# Patient Record
Sex: MALE | Race: WHITE | Employment: OTHER | URBAN - METROPOLITAN AREA
[De-identification: names, ages, dates, MRNs, and addresses within clinical notes are randomized per-mention and may not be internally consistent; named-entity substitution may affect disease eponyms.]

---

## 2017-01-13 ENCOUNTER — MED REC SCAN ONLY (OUTPATIENT)
Dept: INTERNAL MEDICINE CLINIC | Facility: CLINIC | Age: 62
End: 2017-01-13

## 2017-01-17 ENCOUNTER — LAB ENCOUNTER (OUTPATIENT)
Dept: LAB | Facility: HOSPITAL | Age: 62
End: 2017-01-17
Attending: INTERNAL MEDICINE
Payer: COMMERCIAL

## 2017-01-17 DIAGNOSIS — D35.01 BENIGN NEOPLASM OF RIGHT ADRENAL GLAND: Primary | ICD-10-CM

## 2017-01-17 LAB — CORTISOL: 0.8 UG/DL

## 2017-01-17 PROCEDURE — 36415 COLL VENOUS BLD VENIPUNCTURE: CPT

## 2017-01-17 PROCEDURE — 83835 ASSAY OF METANEPHRINES: CPT

## 2017-01-17 PROCEDURE — 82533 TOTAL CORTISOL: CPT

## 2017-01-19 LAB
METANEPHRINE: 0.22 NMOL/L
NORMETANEPHRINE: 0.32 NMOL/L

## 2017-01-24 ENCOUNTER — PATIENT MESSAGE (OUTPATIENT)
Dept: INTERNAL MEDICINE CLINIC | Facility: CLINIC | Age: 62
End: 2017-01-24

## 2017-01-24 NOTE — TELEPHONE ENCOUNTER
From: Kevan Zamora  To:  Claudio Teixeira MD  Sent: 1/24/2017 3:08 PM CST  Subject: Visit Follow-up Question    I have visited Dr. Ruy Stahl and received notice that the blood tests pertaining to the spot found on my right adrenal gland have come back

## 2017-03-14 ENCOUNTER — TELEPHONE (OUTPATIENT)
Dept: INTERNAL MEDICINE CLINIC | Facility: CLINIC | Age: 62
End: 2017-03-14

## 2017-03-14 DIAGNOSIS — R10.30 LOWER ABDOMINAL PAIN: Primary | ICD-10-CM

## 2017-03-14 NOTE — TELEPHONE ENCOUNTER
Called pt to advise info per CB. Pt denied appt w [facility]. Pt states that he would like to see GI first. Pt will call to set up appt before he goes on vacation. Pt would like to see GI (Dr Rajeev Moss)  when he returns from vacation.   Then if need to be he will f/

## 2017-03-14 NOTE — TELEPHONE ENCOUNTER
LOV 11/16/16 w CB      Pt had CT ordered and co mpleted 11/16/16     Labs on 11/28/16  S/w Des Marquez- Pt would like to know when he should come in to f/u. Pt denies any new or worsening sxs. Pt will be on vacation 3/18/17 - 4/08/17.  Pt would like to know if

## 2017-03-14 NOTE — TELEPHONE ENCOUNTER
I would suggest pt see GI for lower abd pain if it is persistent.   Given CT abd/pelvis did not find a cause and per our records his last colonoscopy was in 2007 (at which time he had a polypectomy) he is due for repeat colonoscopy and this would be conside

## 2017-04-12 ENCOUNTER — MED REC SCAN ONLY (OUTPATIENT)
Dept: INTERNAL MEDICINE CLINIC | Facility: CLINIC | Age: 62
End: 2017-04-12

## 2017-04-26 ENCOUNTER — MED REC SCAN ONLY (OUTPATIENT)
Dept: INTERNAL MEDICINE CLINIC | Facility: CLINIC | Age: 62
End: 2017-04-26

## 2017-05-02 ENCOUNTER — LAB ENCOUNTER (OUTPATIENT)
Dept: LAB | Age: 62
End: 2017-05-02
Attending: INTERNAL MEDICINE
Payer: COMMERCIAL

## 2017-05-02 DIAGNOSIS — E55.9 VITAMIN D INSUFFICIENCY: ICD-10-CM

## 2017-05-02 DIAGNOSIS — R10.32 ABDOMINAL PAIN, LEFT LOWER QUADRANT: ICD-10-CM

## 2017-05-02 DIAGNOSIS — Z11.59 NEED FOR HEPATITIS C SCREENING TEST: Primary | ICD-10-CM

## 2017-05-02 PROCEDURE — 86803 HEPATITIS C AB TEST: CPT

## 2017-05-02 PROCEDURE — 36415 COLL VENOUS BLD VENIPUNCTURE: CPT

## 2017-05-02 PROCEDURE — 80053 COMPREHEN METABOLIC PANEL: CPT

## 2017-05-02 PROCEDURE — 85025 COMPLETE CBC W/AUTO DIFF WBC: CPT

## 2017-05-02 PROCEDURE — 82306 VITAMIN D 25 HYDROXY: CPT

## 2017-05-02 NOTE — PROGRESS NOTES
Quick Note:    Vit D now normal. Can decrease Vit D to 2000 int units daily.  Repeat Vit D level in 3-6 mos.  ______

## 2017-08-08 ENCOUNTER — MED REC SCAN ONLY (OUTPATIENT)
Dept: INTERNAL MEDICINE CLINIC | Facility: CLINIC | Age: 62
End: 2017-08-08

## 2018-01-25 ENCOUNTER — TELEPHONE (OUTPATIENT)
Dept: INTERNAL MEDICINE CLINIC | Facility: CLINIC | Age: 63
End: 2018-01-25

## 2018-01-25 DIAGNOSIS — Z01.84 IMMUNITY STATUS TESTING: Primary | ICD-10-CM

## 2018-01-25 NOTE — TELEPHONE ENCOUNTER
Patient states he nor his family know whether he had chicken pox as a child. Explained to pt that he cannot have the shingles vaccine unless we know whether he had the disease. Pended lab for immunity. Please advise.     LOV 11/16/16 with CB for Annual ex

## 2018-01-26 NOTE — TELEPHONE ENCOUNTER
Pt's wife Ariel Tena notified lab ordered to check immunity for chicken pox for [facility]. Pt's wife reminded pt has not been seen since 11/16/16 and needs to schedule a CPE. Wife verbalizes understanding.

## 2018-01-29 ENCOUNTER — LAB ENCOUNTER (OUTPATIENT)
Dept: LAB | Age: 63
End: 2018-01-29
Attending: PHYSICIAN ASSISTANT
Payer: COMMERCIAL

## 2018-01-29 DIAGNOSIS — Z01.84 IMMUNITY STATUS TESTING: ICD-10-CM

## 2018-01-29 PROCEDURE — 86787 VARICELLA-ZOSTER ANTIBODY: CPT

## 2018-01-30 LAB — VZV IGG SER IA-ACNC: POSITIVE

## 2018-01-31 NOTE — PROGRESS NOTES
+ varicella titer. OK to have shingles vaccine. Shingles vaccine can be discussed further (old vs new) at the CPE he is overdue for.

## 2018-06-30 ENCOUNTER — HOSPITAL ENCOUNTER (EMERGENCY)
Facility: HOSPITAL | Age: 63
Discharge: HOME | End: 2018-06-30
Attending: EMERGENCY MEDICINE
Payer: COMMERCIAL

## 2018-06-30 ENCOUNTER — APPOINTMENT (OUTPATIENT)
Dept: CT IMAGING | Facility: HOSPITAL | Age: 63
End: 2018-06-30
Attending: EMERGENCY MEDICINE
Payer: COMMERCIAL

## 2018-06-30 ENCOUNTER — APPOINTMENT (OUTPATIENT)
Dept: GENERAL RADIOLOGY | Facility: HOSPITAL | Age: 63
End: 2018-06-30
Attending: EMERGENCY MEDICINE
Payer: COMMERCIAL

## 2018-06-30 VITALS
WEIGHT: 199 LBS | BODY MASS INDEX: 28.49 KG/M2 | RESPIRATION RATE: 18 BRPM | SYSTOLIC BLOOD PRESSURE: 121 MMHG | HEIGHT: 70 IN | DIASTOLIC BLOOD PRESSURE: 78 MMHG | OXYGEN SATURATION: 96 % | TEMPERATURE: 101 F | HEART RATE: 96 BPM

## 2018-06-30 DIAGNOSIS — R50.9 FEVER OF UNKNOWN ORIGIN: Primary | ICD-10-CM

## 2018-06-30 LAB
ALBUMIN SERPL-MCNC: 3 G/DL (ref 3.5–4.8)
ALP LIVER SERPL-CCNC: 145 U/L (ref 45–117)
ALT SERPL-CCNC: 130 U/L (ref 17–63)
AST SERPL-CCNC: 91 U/L (ref 15–41)
BASOPHILS # BLD AUTO: 0.03 X10(3) UL (ref 0–0.1)
BASOPHILS NFR BLD AUTO: 0.4 %
BILIRUB SERPL-MCNC: 0.5 MG/DL (ref 0.1–2)
BILIRUB UR QL STRIP.AUTO: NEGATIVE
BUN BLD-MCNC: 12 MG/DL (ref 8–20)
CALCIUM BLD-MCNC: 9 MG/DL (ref 8.3–10.3)
CHLORIDE: 100 MMOL/L (ref 101–111)
CO2: 25 MMOL/L (ref 22–32)
CREAT BLD-MCNC: 1.26 MG/DL (ref 0.7–1.3)
EOSINOPHIL # BLD AUTO: 0.02 X10(3) UL (ref 0–0.3)
EOSINOPHIL NFR BLD AUTO: 0.2 %
ERYTHROCYTE [DISTWIDTH] IN BLOOD BY AUTOMATED COUNT: 13.8 % (ref 11.5–16)
GLUCOSE BLD-MCNC: 109 MG/DL (ref 70–99)
GLUCOSE UR STRIP.AUTO-MCNC: NEGATIVE MG/DL
HCT VFR BLD AUTO: 38 % (ref 37–53)
HGB BLD-MCNC: 12.5 G/DL (ref 13–17)
IMMATURE GRANULOCYTE COUNT: 0.09 X10(3) UL (ref 0–1)
IMMATURE GRANULOCYTE RATIO %: 1.1 %
KETONES UR STRIP.AUTO-MCNC: 80 MG/DL
LACTIC ACID: 0.9 MMOL/L (ref 0.5–2)
LEUKOCYTE ESTERASE UR QL STRIP.AUTO: NEGATIVE
LYMPHOCYTES # BLD AUTO: 0.8 X10(3) UL (ref 0.9–4)
LYMPHOCYTES NFR BLD AUTO: 9.4 %
M PROTEIN MFR SERPL ELPH: 7.7 G/DL (ref 6.1–8.3)
MCH RBC QN AUTO: 29.4 PG (ref 27–33.2)
MCHC RBC AUTO-ENTMCNC: 32.9 G/DL (ref 31–37)
MCV RBC AUTO: 89.4 FL (ref 80–99)
MONOCYTES # BLD AUTO: 0.62 X10(3) UL (ref 0.1–1)
MONOCYTES NFR BLD AUTO: 7.3 %
NEUTROPHIL ABS PRELIM: 6.94 X10 (3) UL (ref 1.3–6.7)
NEUTROPHILS # BLD AUTO: 6.94 X10(3) UL (ref 1.3–6.7)
NEUTROPHILS NFR BLD AUTO: 81.6 %
NITRITE UR QL STRIP.AUTO: NEGATIVE
PH UR STRIP.AUTO: 5 [PH] (ref 4.5–8)
PLATELET # BLD AUTO: 267 10(3)UL (ref 150–450)
POTASSIUM SERPL-SCNC: 4.1 MMOL/L (ref 3.6–5.1)
PROT UR STRIP.AUTO-MCNC: 100 MG/DL
RBC # BLD AUTO: 4.25 X10(6)UL (ref 4.3–5.7)
RED CELL DISTRIBUTION WIDTH-SD: 45.5 FL (ref 35.1–46.3)
SODIUM SERPL-SCNC: 134 MMOL/L (ref 136–144)
SP GR UR STRIP.AUTO: 1.03 (ref 1–1.03)
UROBILINOGEN UR STRIP.AUTO-MCNC: <2 MG/DL
WBC # BLD AUTO: 8.5 X10(3) UL (ref 4–13)

## 2018-06-30 PROCEDURE — 74177 CT ABD & PELVIS W/CONTRAST: CPT | Performed by: EMERGENCY MEDICINE

## 2018-06-30 PROCEDURE — 96360 HYDRATION IV INFUSION INIT: CPT

## 2018-06-30 PROCEDURE — 85025 COMPLETE CBC W/AUTO DIFF WBC: CPT | Performed by: EMERGENCY MEDICINE

## 2018-06-30 PROCEDURE — 36415 COLL VENOUS BLD VENIPUNCTURE: CPT

## 2018-06-30 PROCEDURE — 71046 X-RAY EXAM CHEST 2 VIEWS: CPT | Performed by: EMERGENCY MEDICINE

## 2018-06-30 PROCEDURE — 96361 HYDRATE IV INFUSION ADD-ON: CPT

## 2018-06-30 PROCEDURE — 87040 BLOOD CULTURE FOR BACTERIA: CPT | Performed by: EMERGENCY MEDICINE

## 2018-06-30 PROCEDURE — 80053 COMPREHEN METABOLIC PANEL: CPT | Performed by: EMERGENCY MEDICINE

## 2018-06-30 PROCEDURE — 99284 EMERGENCY DEPT VISIT MOD MDM: CPT

## 2018-06-30 PROCEDURE — 81001 URINALYSIS AUTO W/SCOPE: CPT | Performed by: EMERGENCY MEDICINE

## 2018-06-30 PROCEDURE — 83605 ASSAY OF LACTIC ACID: CPT | Performed by: EMERGENCY MEDICINE

## 2018-06-30 RX ORDER — IBUPROFEN 600 MG/1
600 TABLET ORAL ONCE
Status: COMPLETED | OUTPATIENT
Start: 2018-06-30 | End: 2018-06-30

## 2018-06-30 RX ORDER — ACETAMINOPHEN 500 MG
1000 TABLET ORAL ONCE
Status: COMPLETED | OUTPATIENT
Start: 2018-06-30 | End: 2018-06-30

## 2018-06-30 NOTE — ED INITIAL ASSESSMENT (HPI)
Patient states he had a fever and joint pain since Tuesday. Seen at immediate care Wednesday. Fever continues, now head, head, & feet are tingling; patient has decreased appetite and feels weak.

## 2018-06-30 NOTE — ED PROVIDER NOTES
Patient Seen in: [facility] Emergency Department    History   Patient presents with:  Fever (infectious)    Stated Complaint: Fever for 1 week, muscle aches, headache    HPI    53-year-old white male who presents emergency room today for complaint o (38.3 °C) (Oral)   Resp 18   Ht 177.8 cm (5' 10\")   Wt 90.3 kg   SpO2 96%   BMI 28.55 kg/m²         Physical Exam    Well-developed well-nourished male who is sitting on the gurney, he is awake and alert and appears in no acute discomfort or distress. Status                     ---------                               -----------         ------                     CBC W/ DIFFERENTIAL[[id number]]          Abnormal            Final result                 Please view results for these tests on the individual rest of his labs are generally unremarkable white count was 8.5 he did have somewhat of a left shift diminished lymphocyte count. Urinalysis is negative. Chest x-ray shows no pneumonia.   CT scan of the abdomen pelvis was negative for acute intra-abdomina

## 2018-07-02 ENCOUNTER — OFFICE VISIT (OUTPATIENT)
Dept: INTERNAL MEDICINE CLINIC | Facility: CLINIC | Age: 63
End: 2018-07-02

## 2018-07-02 ENCOUNTER — MED REC SCAN ONLY (OUTPATIENT)
Dept: INTERNAL MEDICINE CLINIC | Facility: CLINIC | Age: 63
End: 2018-07-02

## 2018-07-02 VITALS
SYSTOLIC BLOOD PRESSURE: 124 MMHG | TEMPERATURE: 98 F | BODY MASS INDEX: 29 KG/M2 | RESPIRATION RATE: 16 BRPM | DIASTOLIC BLOOD PRESSURE: 78 MMHG | HEART RATE: 72 BPM | WEIGHT: 198.81 LBS

## 2018-07-02 DIAGNOSIS — M25.50 ARTHRALGIA, UNSPECIFIED JOINT: ICD-10-CM

## 2018-07-02 DIAGNOSIS — R50.9 FEVER, UNSPECIFIED FEVER CAUSE: Primary | ICD-10-CM

## 2018-07-02 DIAGNOSIS — R05.9 COUGH: ICD-10-CM

## 2018-07-02 DIAGNOSIS — L03.115 CELLULITIS OF RIGHT LOWER EXTREMITY: ICD-10-CM

## 2018-07-02 PROCEDURE — 99214 OFFICE O/P EST MOD 30 MIN: CPT | Performed by: NURSE PRACTITIONER

## 2018-07-02 RX ORDER — AMOXICILLIN AND CLAVULANATE POTASSIUM 875; 125 MG/1; MG/1
1 TABLET, FILM COATED ORAL 2 TIMES DAILY
Qty: 20 TABLET | Refills: 0 | Status: SHIPPED | OUTPATIENT
Start: 2018-07-02 | End: 2018-07-12

## 2018-07-02 NOTE — PROGRESS NOTES
Gilford Ferraris is a 66year old male. Patient presents with:  Er F/u: Room 10 AH - went to ER with severe joint pain, then got a 101-103 fever , severe headache still having blurred vision.  dehydrated (in [place] from 24-30 of june)   Spot: Dark red spot Erectile Dysfunction. Disp: 18 tablet Rfl: 3   levofloxacin 750 MG Oral Tab Take 1 tablet (750 mg total) by mouth daily. Disp: 10 tablet Rfl: 0   metRONIDAZOLE 500 MG Oral Tab Take 1 tablet (500 mg total) by mouth 3 (three) times daily.  Disp: 30 tablet Rfl masses, HSM or tenderness  EXTREMITIES: no edema, normal strength and tone    PSYCH: alert and oriented x 3    ASSESSMENT AND PLAN:     Fever, unspecified fever cause  (primary encounter diagnosis)  Improving  Monitor.    Cont tylenol and ibuprofen   Cellul

## 2018-07-09 ENCOUNTER — OFFICE VISIT (OUTPATIENT)
Dept: INTERNAL MEDICINE CLINIC | Facility: CLINIC | Age: 63
End: 2018-07-09

## 2018-07-09 VITALS
HEIGHT: 70 IN | OXYGEN SATURATION: 96 % | TEMPERATURE: 98 F | BODY MASS INDEX: 27.63 KG/M2 | HEART RATE: 66 BPM | SYSTOLIC BLOOD PRESSURE: 104 MMHG | WEIGHT: 193 LBS | RESPIRATION RATE: 18 BRPM | DIASTOLIC BLOOD PRESSURE: 60 MMHG

## 2018-07-09 DIAGNOSIS — L03.115 CELLULITIS OF RIGHT LOWER EXTREMITY: Primary | ICD-10-CM

## 2018-07-09 DIAGNOSIS — Z00.00 LABORATORY EXAMINATION ORDERED AS PART OF A COMPLETE PHYSICAL EXAMINATION: ICD-10-CM

## 2018-07-09 DIAGNOSIS — R51.9 MILD HEADACHE: ICD-10-CM

## 2018-07-09 DIAGNOSIS — H53.9 VISUAL CHANGES: ICD-10-CM

## 2018-07-09 DIAGNOSIS — R79.89 ELEVATED LFTS: ICD-10-CM

## 2018-07-09 PROCEDURE — 99214 OFFICE O/P EST MOD 30 MIN: CPT | Performed by: NURSE PRACTITIONER

## 2018-07-09 NOTE — PROGRESS NOTES
Irina Resendiz is a 66year old male. Patient presents with: Follow - Up: room-11 cg. celulitis. antibiotics gave him diarreah still feels weak getting some headaches still but feeling better, no more diarreah.       HPI:   Presents for follow up   See pre oz/week    Family History   Problem Relation Age of Onset   • Cancer Father      chronic lymphocytic leukemia   • Other[other] [OTHER] Father    • Heart Disorder Father    • CAD[other] [OTHER] Mother    • CAD[other] [OTHER] Brother    • Cancer Brother for this visit.

## 2018-07-24 ENCOUNTER — TELEPHONE (OUTPATIENT)
Dept: INTERNAL MEDICINE CLINIC | Facility: CLINIC | Age: 63
End: 2018-07-24

## 2018-07-24 NOTE — TELEPHONE ENCOUNTER
Received medical records from hospitalization in [place]. I saw him for follow up July 9. Please triage to confirm he has fully recovered and is feelin well.

## 2018-07-24 NOTE — TELEPHONE ENCOUNTER
Pt's wife indicates pt is feeling much better, has a scheduled CPE with [facility] for 9/10/18 and will have bloodwork completed prior to ov. Pt states they do not need the information from [address] returned.   FYI to SD.

## 2018-10-16 ENCOUNTER — LAB ENCOUNTER (OUTPATIENT)
Dept: LAB | Age: 63
End: 2018-10-16
Attending: NURSE PRACTITIONER
Payer: COMMERCIAL

## 2018-10-16 DIAGNOSIS — Z00.00 LABORATORY EXAMINATION ORDERED AS PART OF A COMPLETE PHYSICAL EXAMINATION: ICD-10-CM

## 2018-10-16 PROCEDURE — 84443 ASSAY THYROID STIM HORMONE: CPT

## 2018-10-16 PROCEDURE — 80061 LIPID PANEL: CPT

## 2018-10-16 PROCEDURE — 85025 COMPLETE CBC W/AUTO DIFF WBC: CPT

## 2018-10-16 PROCEDURE — 36415 COLL VENOUS BLD VENIPUNCTURE: CPT

## 2018-10-16 PROCEDURE — 80053 COMPREHEN METABOLIC PANEL: CPT

## 2018-10-22 ENCOUNTER — OFFICE VISIT (OUTPATIENT)
Dept: INTERNAL MEDICINE CLINIC | Facility: CLINIC | Age: 63
End: 2018-10-22
Payer: COMMERCIAL

## 2018-10-22 VITALS
RESPIRATION RATE: 16 BRPM | HEART RATE: 80 BPM | HEIGHT: 70 IN | BODY MASS INDEX: 26.63 KG/M2 | DIASTOLIC BLOOD PRESSURE: 68 MMHG | WEIGHT: 186 LBS | SYSTOLIC BLOOD PRESSURE: 130 MMHG

## 2018-10-22 DIAGNOSIS — Z00.00 PE (PHYSICAL EXAM), ANNUAL: Primary | ICD-10-CM

## 2018-10-22 PROCEDURE — 99396 PREV VISIT EST AGE 40-64: CPT | Performed by: INTERNAL MEDICINE

## 2018-10-22 NOTE — PROGRESS NOTES
Patient presents with:  Physical: TN Exam Room 9: CPE, labs completed  Constipation      HPI:  Here for cpe. Doing well. Has chronic constipation. Seen by gi, seen by rheum with questionable Ankylosing Spondylitis.  Still with intermittent back and spine pa GI-Repeat in 5 yrs- Dr Nicholas Maier   • DIAGNOSTIC ANOSCOPY  2008    colonoscopy   • INTRAOPERATIVE NEURO MONITORING N/A 3/7/2014    Performed by Gage Nelson MD at [facility] MAIN OR   • OTHER  as teen    removal of growth left leg/benign   • TONSILLECTOMY mass and no thyromegaly present. Cardiovascular: Normal rate, regular rhythm and intact distal pulses. No murmur, rubs or gallops. Pulmonary/Chest: Effort normal and breath sounds normal. No respiratory distress.  No wheezes, rhonchi or rales  Abdomina

## 2020-03-03 ENCOUNTER — TELEPHONE (OUTPATIENT)
Dept: INTERNAL MEDICINE CLINIC | Facility: CLINIC | Age: 65
End: 2020-03-03

## 2021-01-06 ENCOUNTER — TELEPHONE (OUTPATIENT)
Dept: INTERNAL MEDICINE CLINIC | Facility: CLINIC | Age: 66
End: 2021-01-06

## 2021-01-06 DIAGNOSIS — K13.70 MOUTH LESION: Primary | ICD-10-CM

## 2021-01-06 NOTE — TELEPHONE ENCOUNTER
Pt stated in April had sores in his mouth, that were down his throat and left side of mouth and affected his lymph nodes and his eye. Pt biopsied and learned they were ulcers.   Pt stated they went away for two months and now has one ulcer on left side of m

## 2021-01-06 NOTE — TELEPHONE ENCOUNTER
LOV 10/22/2018, plans to schedule annual today when we call back. Pt reports last March/April had same problem. Reports had sores in mouth and one turned into an abscess.  Had a biopsy at that time via an oral surgeon, results showed a non-specific ulce

## 2021-01-07 NOTE — TELEPHONE ENCOUNTER
Referral placed for Dr. Yasmani Haro but pt is going to see Dr. Joslyn Ortega on Monday 1/11/21-please change referral to Dr. Joslyn Ortega

## 2021-01-21 ENCOUNTER — HOSPITAL ENCOUNTER (OUTPATIENT)
Dept: CT IMAGING | Age: 66
Discharge: HOME | End: 2021-01-21
Attending: OTOLARYNGOLOGY
Payer: COMMERCIAL

## 2021-01-21 DIAGNOSIS — R59.0 CERVICAL LYMPHADENOPATHY: ICD-10-CM

## 2021-01-21 LAB — CREAT BLD-MCNC: 1.1 MG/DL (ref 0.7–1.3)

## 2021-01-21 PROCEDURE — 82565 ASSAY OF CREATININE: CPT

## 2021-01-21 PROCEDURE — 70491 CT SOFT TISSUE NECK W/DYE: CPT | Performed by: OTOLARYNGOLOGY

## 2021-01-22 ENCOUNTER — ORDER TRANSCRIPTION (OUTPATIENT)
Dept: ADMINISTRATIVE | Facility: HOSPITAL | Age: 66
End: 2021-01-22

## 2021-01-22 DIAGNOSIS — Z01.818 PREOP EXAMINATION: Primary | ICD-10-CM

## 2021-01-22 DIAGNOSIS — Z11.59 SPECIAL SCREENING EXAMINATION FOR VIRAL DISEASE: ICD-10-CM

## 2021-01-24 ENCOUNTER — LAB ENCOUNTER (OUTPATIENT)
Dept: LAB | Facility: HOSPITAL | Age: 66
End: 2021-01-24
Attending: OTOLARYNGOLOGY
Payer: COMMERCIAL

## 2021-01-24 DIAGNOSIS — Z11.59 SPECIAL SCREENING EXAMINATION FOR VIRAL DISEASE: ICD-10-CM

## 2021-01-24 DIAGNOSIS — Z01.818 PREOP EXAMINATION: ICD-10-CM

## 2021-01-25 LAB — SARS-COV-2 RNA RESP QL NAA+PROBE: NOT DETECTED

## 2021-01-26 ENCOUNTER — TELEPHONE (OUTPATIENT)
Dept: INTERNAL MEDICINE CLINIC | Facility: CLINIC | Age: 66
End: 2021-01-26

## 2021-01-27 ENCOUNTER — HOSPITAL ENCOUNTER (OUTPATIENT)
Dept: ULTRASOUND IMAGING | Facility: HOSPITAL | Age: 66
Discharge: HOME | End: 2021-01-27
Attending: OTOLARYNGOLOGY
Payer: COMMERCIAL

## 2021-01-27 DIAGNOSIS — R59.0 LYMPHADENOPATHY, SUBMANDIBULAR: ICD-10-CM

## 2021-01-27 PROCEDURE — 88307 TISSUE EXAM BY PATHOLOGIST: CPT | Performed by: OTOLARYNGOLOGY

## 2021-01-27 PROCEDURE — 88341 IMHCHEM/IMCYTCHM EA ADD ANTB: CPT | Performed by: OTOLARYNGOLOGY

## 2021-01-27 PROCEDURE — 88342 IMHCHEM/IMCYTCHM 1ST ANTB: CPT | Performed by: OTOLARYNGOLOGY

## 2021-01-27 PROCEDURE — 88184 FLOWCYTOMETRY/ TC 1 MARKER: CPT | Performed by: OTOLARYNGOLOGY

## 2021-01-27 PROCEDURE — 76942 ECHO GUIDE FOR BIOPSY: CPT | Performed by: OTOLARYNGOLOGY

## 2021-01-27 PROCEDURE — 88185 FLOWCYTOMETRY/TC ADD-ON: CPT | Performed by: OTOLARYNGOLOGY

## 2021-01-27 PROCEDURE — 38505 NEEDLE BIOPSY LYMPH NODES: CPT | Performed by: OTOLARYNGOLOGY

## 2021-01-27 NOTE — PROCEDURES
[facility]  Procedure Note    Stanislaw Prudent Patient Status:  Outpatient    1955 MRN [id number]   Location [address] [place] [address] Attending Rocky Holder MD   Hosp Day # 0 PCP Riki Baum MD     Procedure: US guided neck biopsy    Pre-Pr

## 2021-01-29 LAB
CD10 CELLS NFR SPEC: 1 %
CD11C CELLS NFR SPEC: 22 %
CD14 CELLS NFR SPEC: 5 %
CD19 CELLS NFR SPEC: 41 %
CD19/CD10 CELLS: <1 %
CD20 CELLS NFR SPEC: 41 %
CD22 CELLS NFR SPEC: 41 %
CD23 CELLS NFR SPEC: 23 %
CD3 CELLS NFR SPEC: 57 %
CD3+CD4+ CELLS NFR SPEC: 40 %
CD3+CD4+ CELLS/CD3+CD8+ CLL SPEC: 2.5
CD3+CD8+ CELLS NFR SPEC: 16 %
CD45 CELLS NFR SPEC: 100 %
CD5 CELLS NFR SPEC: 57 %
CD5/CD19 CELLS: 1 %
CD56 CELLS NFR SPEC: 2 %
CD7 CELLS NFR SPEC: 47 %
CELL SURF KAPPA/LAMBDA RATIO: 1.2
CELL SURF LAMBDA LIGHT CHAIN: 18 %
CELL SURFACE KAPPA LIGHT CHAIN: 22 %
FMC7 CELLS NFR SPEC: 30 %

## 2021-02-03 ENCOUNTER — HOSPITAL ENCOUNTER (OUTPATIENT)
Dept: NUCLEAR MEDICINE | Facility: HOSPITAL | Age: 66
Discharge: HOME | End: 2021-02-03
Attending: OTOLARYNGOLOGY
Payer: COMMERCIAL

## 2021-02-03 DIAGNOSIS — C77.0 SECONDARY MALIGNANT NEOPLASM OF CERVICAL LYMPH NODE (HCC): ICD-10-CM

## 2021-02-03 LAB — GLUCOSE BLD-MCNC: 92 MG/DL (ref 70–99)

## 2021-02-03 PROCEDURE — 82962 GLUCOSE BLOOD TEST: CPT

## 2021-02-03 PROCEDURE — 78815 PET IMAGE W/CT SKULL-THIGH: CPT | Performed by: OTOLARYNGOLOGY

## 2021-05-03 ENCOUNTER — TELEPHONE (OUTPATIENT)
Dept: SURGERY | Facility: CLINIC | Age: 66
End: 2021-05-03

## 2021-05-03 NOTE — TELEPHONE ENCOUNTER
Duane from Dr Nicholas Aguilar office calling to find out what kind of metal was used by Dr Ying Fajardo for pt's neck fusion surgery in 2016. Dr Bill Esposito wants to order an MRI.   Pls call Duane, or any nurse at [phone] to advise

## 2021-05-03 NOTE — TELEPHONE ENCOUNTER
Returned call and left message stating this is not Dr Darwin Paiz office. Gave # for his office at [facility].

## 2022-04-21 ENCOUNTER — TELEPHONE (OUTPATIENT)
Dept: INTERNAL MEDICINE CLINIC | Facility: CLINIC | Age: 67
End: 2022-04-21

## 2022-04-21 NOTE — TELEPHONE ENCOUNTER
Future Appointments   Date Time Provider Candie Corine   5/3/2022  8:00 AM CINDI Shaikh [facility]     Patient is scheduled for Annual Physical.  Please place orders with Courtney Alatorre. Patient aware to fast.  No call back required. Patient informed that labs need to be completed no sooner than 2 weeks prior to the appointment.

## 2022-04-27 ENCOUNTER — LAB ENCOUNTER (OUTPATIENT)
Dept: LAB | Age: 67
End: 2022-04-27
Attending: NURSE PRACTITIONER
Payer: COMMERCIAL

## 2022-04-27 DIAGNOSIS — Z13.0 SCREENING FOR DISORDER OF BLOOD AND BLOOD-FORMING ORGANS: ICD-10-CM

## 2022-04-27 DIAGNOSIS — Z00.00 ROUTINE GENERAL MEDICAL EXAMINATION AT A HEALTH CARE FACILITY: ICD-10-CM

## 2022-04-27 DIAGNOSIS — Z13.228 SCREENING FOR METABOLIC DISORDER: ICD-10-CM

## 2022-04-27 DIAGNOSIS — Z12.5 SCREENING FOR MALIGNANT NEOPLASM OF PROSTATE: ICD-10-CM

## 2022-04-27 DIAGNOSIS — Z13.220 SCREENING FOR LIPID DISORDERS: ICD-10-CM

## 2022-04-27 DIAGNOSIS — Z13.29 SCREENING FOR THYROID DISORDER: ICD-10-CM

## 2022-04-27 LAB
ALBUMIN SERPL-MCNC: 3.4 G/DL (ref 3.4–5)
ALBUMIN/GLOB SERPL: 1.1 {RATIO} (ref 1–2)
ALP LIVER SERPL-CCNC: 80 U/L
ALT SERPL-CCNC: 17 U/L
ANION GAP SERPL CALC-SCNC: 4 MMOL/L (ref 0–18)
AST SERPL-CCNC: 15 U/L (ref 15–37)
BASOPHILS # BLD AUTO: 0.03 X10(3) UL (ref 0–0.2)
BASOPHILS NFR BLD AUTO: 0.6 %
BILIRUB SERPL-MCNC: 0.4 MG/DL (ref 0.1–2)
BUN BLD-MCNC: 18 MG/DL (ref 7–18)
CALCIUM BLD-MCNC: 9.2 MG/DL (ref 8.5–10.1)
CHLORIDE SERPL-SCNC: 107 MMOL/L (ref 98–112)
CHOLEST SERPL-MCNC: 186 MG/DL (ref ?–200)
CO2 SERPL-SCNC: 30 MMOL/L (ref 21–32)
COMPLEXED PSA SERPL-MCNC: 3.4 NG/ML (ref ?–4)
CREAT BLD-MCNC: 0.87 MG/DL
EOSINOPHIL # BLD AUTO: 0.12 X10(3) UL (ref 0–0.7)
EOSINOPHIL NFR BLD AUTO: 2.3 %
ERYTHROCYTE [DISTWIDTH] IN BLOOD BY AUTOMATED COUNT: 13.3 %
FASTING PATIENT LIPID ANSWER: YES
FASTING STATUS PATIENT QL REPORTED: YES
GLOBULIN PLAS-MCNC: 3.1 G/DL (ref 2.8–4.4)
GLUCOSE BLD-MCNC: 86 MG/DL (ref 70–99)
HCT VFR BLD AUTO: 43.8 %
HDLC SERPL-MCNC: 64 MG/DL (ref 40–59)
HGB BLD-MCNC: 13.5 G/DL
IMM GRANULOCYTES # BLD AUTO: 0.04 X10(3) UL (ref 0–1)
IMM GRANULOCYTES NFR BLD: 0.8 %
LDLC SERPL CALC-MCNC: 110 MG/DL (ref ?–100)
LYMPHOCYTES # BLD AUTO: 0.77 X10(3) UL (ref 1–4)
LYMPHOCYTES NFR BLD AUTO: 14.6 %
MCH RBC QN AUTO: 29.7 PG (ref 26–34)
MCHC RBC AUTO-ENTMCNC: 30.8 G/DL (ref 31–37)
MCV RBC AUTO: 96.3 FL
MONOCYTES # BLD AUTO: 0.51 X10(3) UL (ref 0.1–1)
MONOCYTES NFR BLD AUTO: 9.6 %
NEUTROPHILS # BLD AUTO: 3.82 X10 (3) UL (ref 1.5–7.7)
NEUTROPHILS # BLD AUTO: 3.82 X10(3) UL (ref 1.5–7.7)
NEUTROPHILS NFR BLD AUTO: 72.1 %
NONHDLC SERPL-MCNC: 122 MG/DL (ref ?–130)
OSMOLALITY SERPL CALC.SUM OF ELEC: 293 MOSM/KG (ref 275–295)
PLATELET # BLD AUTO: 296 10(3)UL (ref 150–450)
POTASSIUM SERPL-SCNC: 4.3 MMOL/L (ref 3.5–5.1)
PROT SERPL-MCNC: 6.5 G/DL (ref 6.4–8.2)
RBC # BLD AUTO: 4.55 X10(6)UL
SODIUM SERPL-SCNC: 141 MMOL/L (ref 136–145)
TRIGL SERPL-MCNC: 64 MG/DL (ref 30–149)
TSI SER-ACNC: 3.13 MIU/ML (ref 0.36–3.74)
VLDLC SERPL CALC-MCNC: 11 MG/DL (ref 0–30)
WBC # BLD AUTO: 5.3 X10(3) UL (ref 4–11)

## 2022-04-27 PROCEDURE — 80061 LIPID PANEL: CPT | Performed by: NURSE PRACTITIONER

## 2022-04-27 PROCEDURE — 84153 ASSAY OF PSA TOTAL: CPT | Performed by: NURSE PRACTITIONER

## 2022-04-27 PROCEDURE — 80050 GENERAL HEALTH PANEL: CPT | Performed by: NURSE PRACTITIONER

## 2022-05-03 ENCOUNTER — OFFICE VISIT (OUTPATIENT)
Dept: INTERNAL MEDICINE CLINIC | Facility: CLINIC | Age: 67
End: 2022-05-03
Payer: COMMERCIAL

## 2022-05-03 VITALS
HEART RATE: 80 BPM | SYSTOLIC BLOOD PRESSURE: 124 MMHG | HEIGHT: 70 IN | DIASTOLIC BLOOD PRESSURE: 62 MMHG | TEMPERATURE: 97 F | BODY MASS INDEX: 23.91 KG/M2 | WEIGHT: 167 LBS

## 2022-05-03 DIAGNOSIS — Z92.3 HISTORY OF HEAD AND NECK RADIATION: ICD-10-CM

## 2022-05-03 DIAGNOSIS — Z00.00 ROUTINE GENERAL MEDICAL EXAMINATION AT A HEALTH CARE FACILITY: Primary | ICD-10-CM

## 2022-05-03 DIAGNOSIS — M45.2 ANKYLOSING SPONDYLITIS OF CERVICAL REGION (HCC): ICD-10-CM

## 2022-05-03 DIAGNOSIS — R97.20 INCREASED PROSTATE SPECIFIC ANTIGEN (PSA) VELOCITY: ICD-10-CM

## 2022-05-03 DIAGNOSIS — Z85.810 HISTORY OF TONGUE CANCER: ICD-10-CM

## 2022-05-03 DIAGNOSIS — E78.5 DYSLIPIDEMIA: ICD-10-CM

## 2022-05-03 DIAGNOSIS — R97.20 ELEVATED PSA: ICD-10-CM

## 2022-05-03 DIAGNOSIS — Z98.890 HISTORY OF CERVICAL DISCECTOMY: ICD-10-CM

## 2022-05-03 PROCEDURE — 3008F BODY MASS INDEX DOCD: CPT | Performed by: NURSE PRACTITIONER

## 2022-05-03 PROCEDURE — 3078F DIAST BP <80 MM HG: CPT | Performed by: NURSE PRACTITIONER

## 2022-05-03 PROCEDURE — 3074F SYST BP LT 130 MM HG: CPT | Performed by: NURSE PRACTITIONER

## 2022-05-03 PROCEDURE — 99386 PREV VISIT NEW AGE 40-64: CPT | Performed by: NURSE PRACTITIONER

## 2022-07-25 ENCOUNTER — OFFICE VISIT (OUTPATIENT)
Facility: LOCATION | Age: 67
End: 2022-07-25
Payer: MEDICARE

## 2022-07-25 VITALS
TEMPERATURE: 98 F | HEART RATE: 80 BPM | DIASTOLIC BLOOD PRESSURE: 85 MMHG | WEIGHT: 160 LBS | BODY MASS INDEX: 23 KG/M2 | SYSTOLIC BLOOD PRESSURE: 158 MMHG

## 2022-07-25 DIAGNOSIS — R13.10 DYSPHAGIA, UNSPECIFIED TYPE: ICD-10-CM

## 2022-07-25 DIAGNOSIS — J31.0 CHRONIC RHINITIS: Primary | ICD-10-CM

## 2022-07-25 RX ORDER — ACETAMINOPHEN 500 MG
500 TABLET ORAL
COMMUNITY

## 2022-07-25 RX ORDER — IPRATROPIUM BROMIDE 42 UG/1
2 SPRAY, METERED NASAL 4 TIMES DAILY
Qty: 15 ML | Refills: 2 | Status: SHIPPED | OUTPATIENT
Start: 2022-07-25

## 2022-07-29 PROBLEM — Z86.010 HISTORY OF ADENOMATOUS POLYP OF COLON: Status: ACTIVE | Noted: 2022-07-29

## 2022-07-29 PROBLEM — D12.0 BENIGN NEOPLASM OF CECUM: Status: ACTIVE | Noted: 2022-07-29

## 2022-07-29 PROBLEM — Z86.0101 HISTORY OF ADENOMATOUS POLYP OF COLON: Status: ACTIVE | Noted: 2022-07-29

## 2023-04-14 ENCOUNTER — TELEPHONE (OUTPATIENT)
Dept: INTERNAL MEDICINE CLINIC | Facility: CLINIC | Age: 68
End: 2023-04-14

## 2023-04-18 ENCOUNTER — TELEPHONE (OUTPATIENT)
Dept: INTERNAL MEDICINE CLINIC | Facility: CLINIC | Age: 68
End: 2023-04-18

## 2023-04-18 DIAGNOSIS — E78.5 DYSLIPIDEMIA: ICD-10-CM

## 2023-04-18 DIAGNOSIS — Z00.00 ROUTINE GENERAL MEDICAL EXAMINATION AT A HEALTH CARE FACILITY: Primary | ICD-10-CM

## 2023-04-18 DIAGNOSIS — R97.20 ELEVATED PSA: ICD-10-CM

## 2023-04-18 NOTE — TELEPHONE ENCOUNTER
Pt is scheduled for cpe   Future Appointments   Date Time Provider Candie Pool   5/8/2023  9:00 AM Justus Ambriz MD [facility]     Informed must fast no call back required.  Orders to [facility]

## 2023-04-28 ENCOUNTER — LAB ENCOUNTER (OUTPATIENT)
Dept: LAB | Facility: HOSPITAL | Age: 68
End: 2023-04-28
Attending: FAMILY MEDICINE
Payer: MEDICARE

## 2023-04-28 DIAGNOSIS — R97.20 ELEVATED PSA: ICD-10-CM

## 2023-04-28 DIAGNOSIS — Z00.00 ROUTINE GENERAL MEDICAL EXAMINATION AT A HEALTH CARE FACILITY: ICD-10-CM

## 2023-04-28 LAB
ALBUMIN SERPL-MCNC: 3.4 G/DL (ref 3.4–5)
ALBUMIN/GLOB SERPL: 1 {RATIO} (ref 1–2)
ALP LIVER SERPL-CCNC: 77 U/L
ALT SERPL-CCNC: 17 U/L
ANION GAP SERPL CALC-SCNC: <0 MMOL/L (ref 0–18)
AST SERPL-CCNC: 17 U/L (ref 15–37)
BASOPHILS # BLD AUTO: 0.03 X10(3) UL (ref 0–0.2)
BASOPHILS NFR BLD AUTO: 0.7 %
BILIRUB SERPL-MCNC: 0.5 MG/DL (ref 0.1–2)
BUN BLD-MCNC: 13 MG/DL (ref 7–18)
CALCIUM BLD-MCNC: 8.8 MG/DL (ref 8.5–10.1)
CHLORIDE SERPL-SCNC: 105 MMOL/L (ref 98–112)
CHOLEST SERPL-MCNC: 178 MG/DL (ref ?–200)
CO2 SERPL-SCNC: 32 MMOL/L (ref 21–32)
CREAT BLD-MCNC: 0.96 MG/DL
EOSINOPHIL # BLD AUTO: 0.13 X10(3) UL (ref 0–0.7)
EOSINOPHIL NFR BLD AUTO: 3.1 %
ERYTHROCYTE [DISTWIDTH] IN BLOOD BY AUTOMATED COUNT: 13.2 %
FASTING PATIENT LIPID ANSWER: YES
FASTING STATUS PATIENT QL REPORTED: YES
GFR SERPLBLD BASED ON 1.73 SQ M-ARVRAT: 88 ML/MIN/1.73M2 (ref 60–?)
GLOBULIN PLAS-MCNC: 3.5 G/DL (ref 2.8–4.4)
GLUCOSE BLD-MCNC: 97 MG/DL (ref 70–99)
HCT VFR BLD AUTO: 41.7 %
HDLC SERPL-MCNC: 74 MG/DL (ref 40–59)
HGB BLD-MCNC: 13.7 G/DL
IMM GRANULOCYTES # BLD AUTO: 0.02 X10(3) UL (ref 0–1)
IMM GRANULOCYTES NFR BLD: 0.5 %
LDLC SERPL CALC-MCNC: 91 MG/DL (ref ?–100)
LYMPHOCYTES # BLD AUTO: 0.71 X10(3) UL (ref 1–4)
LYMPHOCYTES NFR BLD AUTO: 17.2 %
MCH RBC QN AUTO: 30.3 PG (ref 26–34)
MCHC RBC AUTO-ENTMCNC: 32.9 G/DL (ref 31–37)
MCV RBC AUTO: 92.3 FL
MONOCYTES # BLD AUTO: 0.43 X10(3) UL (ref 0.1–1)
MONOCYTES NFR BLD AUTO: 10.4 %
NEUTROPHILS # BLD AUTO: 2.81 X10 (3) UL (ref 1.5–7.7)
NEUTROPHILS # BLD AUTO: 2.81 X10(3) UL (ref 1.5–7.7)
NEUTROPHILS NFR BLD AUTO: 68.1 %
NONHDLC SERPL-MCNC: 104 MG/DL (ref ?–130)
OSMOLALITY SERPL CALC.SUM OF ELEC: 282 MOSM/KG (ref 275–295)
PLATELET # BLD AUTO: 236 10(3)UL (ref 150–450)
POTASSIUM SERPL-SCNC: 4.1 MMOL/L (ref 3.5–5.1)
PROT SERPL-MCNC: 6.9 G/DL (ref 6.4–8.2)
PSA SERPL-MCNC: 2.48 NG/ML (ref ?–4)
RBC # BLD AUTO: 4.52 X10(6)UL
SODIUM SERPL-SCNC: 136 MMOL/L (ref 136–145)
T4 FREE SERPL-MCNC: 0.8 NG/DL (ref 0.8–1.7)
TRIGL SERPL-MCNC: 69 MG/DL (ref 30–149)
TSI SER-ACNC: 4.68 MIU/ML (ref 0.36–3.74)
VLDLC SERPL CALC-MCNC: 11 MG/DL (ref 0–30)
WBC # BLD AUTO: 4.1 X10(3) UL (ref 4–11)

## 2023-04-28 PROCEDURE — 80053 COMPREHEN METABOLIC PANEL: CPT

## 2023-04-28 PROCEDURE — 84439 ASSAY OF FREE THYROXINE: CPT

## 2023-04-28 PROCEDURE — 80061 LIPID PANEL: CPT

## 2023-04-28 PROCEDURE — 84443 ASSAY THYROID STIM HORMONE: CPT

## 2023-04-28 PROCEDURE — 85025 COMPLETE CBC W/AUTO DIFF WBC: CPT

## 2023-04-28 PROCEDURE — 84153 ASSAY OF PSA TOTAL: CPT

## 2023-04-28 PROCEDURE — 36415 COLL VENOUS BLD VENIPUNCTURE: CPT

## 2023-05-08 ENCOUNTER — OFFICE VISIT (OUTPATIENT)
Dept: INTERNAL MEDICINE CLINIC | Facility: CLINIC | Age: 68
End: 2023-05-08
Payer: MEDICARE

## 2023-05-08 VITALS
BODY MASS INDEX: 23.66 KG/M2 | DIASTOLIC BLOOD PRESSURE: 54 MMHG | HEART RATE: 58 BPM | SYSTOLIC BLOOD PRESSURE: 116 MMHG | WEIGHT: 169 LBS | TEMPERATURE: 98 F | HEIGHT: 71 IN

## 2023-05-08 DIAGNOSIS — E03.8 SUBCLINICAL HYPOTHYROIDISM: ICD-10-CM

## 2023-05-08 DIAGNOSIS — E78.5 DYSLIPIDEMIA: ICD-10-CM

## 2023-05-08 DIAGNOSIS — R79.89 ABNORMAL THYROID BLOOD TEST: ICD-10-CM

## 2023-05-08 DIAGNOSIS — Z00.00 ENCOUNTER FOR ANNUAL HEALTH EXAMINATION: Primary | ICD-10-CM

## 2023-05-08 DIAGNOSIS — R97.20 ELEVATED PSA: ICD-10-CM

## 2023-05-08 DIAGNOSIS — Z23 ENCOUNTER FOR IMMUNIZATION: ICD-10-CM

## 2023-05-08 DIAGNOSIS — Z98.1 HISTORY OF FUSION OF CERVICAL SPINE: ICD-10-CM

## 2023-05-08 DIAGNOSIS — M89.8X1 PERISCAPULAR PAIN: ICD-10-CM

## 2023-05-08 DIAGNOSIS — Z85.810 HISTORY OF TONGUE CANCER: ICD-10-CM

## 2023-05-08 DIAGNOSIS — Z87.39 HISTORY OF GOUT: ICD-10-CM

## 2023-05-08 DIAGNOSIS — Z86.010 HISTORY OF ADENOMATOUS POLYP OF COLON: ICD-10-CM

## 2023-05-08 DIAGNOSIS — Z98.890 HISTORY OF CERVICAL DISCECTOMY: ICD-10-CM

## 2023-05-08 DIAGNOSIS — M45.9 ANKYLOSING SPONDYLITIS, UNSPECIFIED SITE OF SPINE (HCC): ICD-10-CM

## 2023-05-08 PROBLEM — D12.0 BENIGN NEOPLASM OF CECUM: Status: RESOLVED | Noted: 2022-07-29 | Resolved: 2023-05-08

## 2023-05-08 LAB
ATRIAL RATE: 65 BPM
P AXIS: 83 DEGREES
P-R INTERVAL: 156 MS
Q-T INTERVAL: 380 MS
QRS DURATION: 90 MS
QTC CALCULATION (BEZET): 395 MS
R AXIS: 78 DEGREES
T AXIS: 61 DEGREES
VENTRICULAR RATE: 65 BPM

## 2023-10-15 ENCOUNTER — OFFICE VISIT (OUTPATIENT)
Dept: FAMILY MEDICINE CLINIC | Facility: CLINIC | Age: 68
End: 2023-10-15
Payer: MEDICARE

## 2023-10-15 VITALS
BODY MASS INDEX: 24.05 KG/M2 | OXYGEN SATURATION: 99 % | RESPIRATION RATE: 18 BRPM | HEIGHT: 70 IN | DIASTOLIC BLOOD PRESSURE: 80 MMHG | WEIGHT: 168 LBS | TEMPERATURE: 98 F | HEART RATE: 66 BPM | SYSTOLIC BLOOD PRESSURE: 136 MMHG

## 2023-10-15 DIAGNOSIS — L25.5 CONTACT DERMATITIS DUE TO PLANT: Primary | ICD-10-CM

## 2023-10-15 PROCEDURE — 99213 OFFICE O/P EST LOW 20 MIN: CPT | Performed by: PHYSICIAN ASSISTANT

## 2023-10-15 RX ORDER — BETAMETHASONE DIPROPIONATE 0.5 MG/G
1 CREAM TOPICAL 2 TIMES DAILY
Qty: 1 EACH | Refills: 0 | Status: SHIPPED | OUTPATIENT
Start: 2023-10-15 | End: 2023-10-29

## 2023-10-15 NOTE — PATIENT INSTRUCTIONS
Start cream twice daily.  Do not use on the face   Oral antihistamines for itch as needed   Do not pick skin   Please follow up with PCP if no improvement or if symptoms worsen

## 2023-11-27 ENCOUNTER — LAB ENCOUNTER (OUTPATIENT)
Dept: LAB | Age: 68
End: 2023-11-27
Attending: FAMILY MEDICINE
Payer: MEDICARE

## 2023-11-27 DIAGNOSIS — E03.8 SUBCLINICAL HYPOTHYROIDISM: ICD-10-CM

## 2023-11-27 DIAGNOSIS — R79.89 ABNORMAL THYROID BLOOD TEST: ICD-10-CM

## 2023-11-27 LAB
T4 FREE SERPL-MCNC: 0.7 NG/DL (ref 0.8–1.7)
TSI SER-ACNC: 3.84 MIU/ML (ref 0.36–3.74)

## 2023-11-27 PROCEDURE — 36415 COLL VENOUS BLD VENIPUNCTURE: CPT

## 2023-11-27 PROCEDURE — 84439 ASSAY OF FREE THYROXINE: CPT

## 2023-11-27 PROCEDURE — 84443 ASSAY THYROID STIM HORMONE: CPT

## 2023-11-29 ENCOUNTER — TELEMEDICINE (OUTPATIENT)
Dept: INTERNAL MEDICINE CLINIC | Facility: CLINIC | Age: 68
End: 2023-11-29
Payer: MEDICARE

## 2023-11-29 DIAGNOSIS — R79.89 ABNORMAL THYROID BLOOD TEST: Primary | ICD-10-CM

## 2023-11-29 DIAGNOSIS — Z92.3 HISTORY OF HEAD AND NECK RADIATION: ICD-10-CM

## 2023-11-29 PROCEDURE — 99214 OFFICE O/P EST MOD 30 MIN: CPT | Performed by: FAMILY MEDICINE

## 2023-11-29 NOTE — PROGRESS NOTES
Due to COVID-19 ACTION PLAN, the patient's office visit was converted to a video visit with informed patient consent. Time Spent: 19 min    Subjective     HPI:   Fartun Cantu is a 79year old male who presents for follow-up. His repeat thyroid studies demonstrated mildly elevated TSH and low T4. No overt hypothyroid symptoms. He has h/o BOT cancer treated with resection and RT at [facility] in 2021. REVIEW OF SYSTEMS:  GENERAL: No fever, chills, no recent illness. Physical Exam:  Appears well on exam.     Assessment and Plan:  Fartun Cantu is presenting for follow-up    Abnormal thyroid blood test  History of head and neck radiation  Most recent TFT's consistent with hypothyroidism. Patient has h/o RT for treatment of his tongue cancer. Will confirm results with repeat TFT's next week and thyroid US and begin treatment with thyroid replacement if serologies consistent with hypothyroidism.   - TSH and Free T4 [E]; Future  - US THYROID ([id number]); Future    Ya Larkin MD    Fartun Cantu understands phone evaluation is not a substitute for face-to-face examination or emergency care. Patient advised to go to ER or call 911 for worsening symptoms or acute distress. Please note that the following visit was completed using two-way, real-time interactive video communication. This has been done in good chika to provide continuity of care in the best interest of the provider-patient relationship, due to the on-going public health crisis/national emergency and because of restrictions of visitation. There are limitations of this visit as no physical exam could be performed. Every conscious effort was taken to allow for sufficient and adequate time. This billing visit was spent on reviewing labs, medications, radiology tests and decision making. Appropriate medical decision-making and tests are ordered as detailed in the plan of care above.

## 2023-12-11 ENCOUNTER — LAB ENCOUNTER (OUTPATIENT)
Dept: LAB | Age: 68
End: 2023-12-11
Attending: FAMILY MEDICINE
Payer: MEDICARE

## 2023-12-11 DIAGNOSIS — Z92.3 HISTORY OF HEAD AND NECK RADIATION: ICD-10-CM

## 2023-12-11 DIAGNOSIS — R79.89 ABNORMAL THYROID BLOOD TEST: ICD-10-CM

## 2023-12-11 LAB
T4 FREE SERPL-MCNC: 0.7 NG/DL (ref 0.8–1.7)
TSI SER-ACNC: 5.28 MIU/ML (ref 0.36–3.74)

## 2023-12-11 PROCEDURE — 84439 ASSAY OF FREE THYROXINE: CPT

## 2023-12-11 PROCEDURE — 36415 COLL VENOUS BLD VENIPUNCTURE: CPT

## 2023-12-11 PROCEDURE — 84443 ASSAY THYROID STIM HORMONE: CPT

## 2023-12-13 ENCOUNTER — HOSPITAL ENCOUNTER (OUTPATIENT)
Dept: ULTRASOUND IMAGING | Age: 68
Discharge: HOME | End: 2023-12-13
Attending: FAMILY MEDICINE
Payer: MEDICARE

## 2023-12-13 DIAGNOSIS — Z92.3 HISTORY OF HEAD AND NECK RADIATION: ICD-10-CM

## 2023-12-13 DIAGNOSIS — R79.89 ABNORMAL THYROID BLOOD TEST: ICD-10-CM

## 2023-12-13 PROCEDURE — 76536 US EXAM OF HEAD AND NECK: CPT | Performed by: FAMILY MEDICINE

## 2024-02-09 DIAGNOSIS — R79.89 ABNORMAL THYROID BLOOD TEST: Primary | ICD-10-CM

## 2024-02-09 DIAGNOSIS — E03.9 HYPOTHYROIDISM, UNSPECIFIED TYPE: ICD-10-CM

## 2024-02-09 RX ORDER — LEVOTHYROXINE SODIUM 0.07 MG/1
75 TABLET ORAL
Qty: 90 TABLET | Refills: 0 | Status: SHIPPED | OUTPATIENT
Start: 2024-02-09 | End: 2024-02-09

## 2024-02-10 DIAGNOSIS — E03.9 HYPOTHYROIDISM, UNSPECIFIED TYPE: ICD-10-CM

## 2024-02-13 RX ORDER — LEVOTHYROXINE SODIUM 0.07 MG/1
75 TABLET ORAL
Qty: 90 TABLET | Refills: 0 | OUTPATIENT
Start: 2024-02-13

## 2024-03-29 ENCOUNTER — TELEPHONE (OUTPATIENT)
Dept: INTERNAL MEDICINE CLINIC | Facility: CLINIC | Age: 69
End: 2024-03-29

## 2024-03-29 DIAGNOSIS — Z00.00 ROUTINE GENERAL MEDICAL EXAMINATION AT A HEALTH CARE FACILITY: Primary | ICD-10-CM

## 2024-03-29 DIAGNOSIS — E78.5 DYSLIPIDEMIA: ICD-10-CM

## 2024-03-29 DIAGNOSIS — R97.20 ELEVATED PSA: ICD-10-CM

## 2024-03-29 DIAGNOSIS — E03.9 HYPOTHYROIDISM, UNSPECIFIED TYPE: ICD-10-CM

## 2024-03-29 NOTE — TELEPHONE ENCOUNTER
Informed must fast no call back required. Orders to  [facility]  Future Appointments   Date Time Provider Department Center   4/18/2024  9:00 AM Mitch Hunter MD [facility]

## 2024-04-04 ENCOUNTER — LABORATORY ENCOUNTER (OUTPATIENT)
Dept: LAB | Age: 69
End: 2024-04-04
Attending: FAMILY MEDICINE
Payer: MEDICARE

## 2024-04-04 DIAGNOSIS — R97.20 ELEVATED PSA: ICD-10-CM

## 2024-04-04 DIAGNOSIS — Z00.00 ROUTINE GENERAL MEDICAL EXAMINATION AT A HEALTH CARE FACILITY: ICD-10-CM

## 2024-04-04 DIAGNOSIS — E61.1 IRON DEFICIENCY: ICD-10-CM

## 2024-04-04 DIAGNOSIS — E78.5 DYSLIPIDEMIA: ICD-10-CM

## 2024-04-04 DIAGNOSIS — E03.9 HYPOTHYROIDISM, UNSPECIFIED TYPE: ICD-10-CM

## 2024-04-04 LAB
ALBUMIN SERPL-MCNC: 3.5 G/DL (ref 3.4–5)
ALBUMIN/GLOB SERPL: 1.1 {RATIO} (ref 1–2)
ALP LIVER SERPL-CCNC: 61 U/L
ALT SERPL-CCNC: 18 U/L
ANION GAP SERPL CALC-SCNC: 1 MMOL/L (ref 0–18)
AST SERPL-CCNC: 13 U/L (ref 15–37)
BASOPHILS # BLD AUTO: 0.05 X10(3) UL (ref 0–0.2)
BASOPHILS NFR BLD AUTO: 1 %
BILIRUB SERPL-MCNC: 0.7 MG/DL (ref 0.1–2)
BUN BLD-MCNC: 11 MG/DL (ref 9–23)
CALCIUM BLD-MCNC: 8.6 MG/DL (ref 8.5–10.1)
CHLORIDE SERPL-SCNC: 109 MMOL/L (ref 98–112)
CHOLEST SERPL-MCNC: 196 MG/DL (ref ?–200)
CO2 SERPL-SCNC: 30 MMOL/L (ref 21–32)
CREAT BLD-MCNC: 1.33 MG/DL
DEPRECATED HBV CORE AB SER IA-ACNC: 162.3 NG/ML
EGFRCR SERPLBLD CKD-EPI 2021: 59 ML/MIN/1.73M2 (ref 60–?)
EOSINOPHIL # BLD AUTO: 0.13 X10(3) UL (ref 0–0.7)
EOSINOPHIL NFR BLD AUTO: 2.6 %
ERYTHROCYTE [DISTWIDTH] IN BLOOD BY AUTOMATED COUNT: 13.5 %
FASTING PATIENT LIPID ANSWER: YES
FASTING STATUS PATIENT QL REPORTED: YES
GLOBULIN PLAS-MCNC: 3.3 G/DL (ref 2.8–4.4)
GLUCOSE BLD-MCNC: 90 MG/DL (ref 70–99)
HCT VFR BLD AUTO: 42.9 %
HDLC SERPL-MCNC: 62 MG/DL (ref 40–59)
HGB BLD-MCNC: 14.2 G/DL
IMM GRANULOCYTES # BLD AUTO: 0.06 X10(3) UL (ref 0–1)
IMM GRANULOCYTES NFR BLD: 1.2 %
IRON SATN MFR SERPL: 31 %
IRON SERPL-MCNC: 92 UG/DL
LDLC SERPL CALC-MCNC: 116 MG/DL (ref ?–100)
LYMPHOCYTES # BLD AUTO: 0.86 X10(3) UL (ref 1–4)
LYMPHOCYTES NFR BLD AUTO: 17.4 %
MCH RBC QN AUTO: 31.1 PG (ref 26–34)
MCHC RBC AUTO-ENTMCNC: 33.1 G/DL (ref 31–37)
MCV RBC AUTO: 94.1 FL
MONOCYTES # BLD AUTO: 0.59 X10(3) UL (ref 0.1–1)
MONOCYTES NFR BLD AUTO: 11.9 %
NEUTROPHILS # BLD AUTO: 3.26 X10 (3) UL (ref 1.5–7.7)
NEUTROPHILS # BLD AUTO: 3.26 X10(3) UL (ref 1.5–7.7)
NEUTROPHILS NFR BLD AUTO: 65.9 %
NONHDLC SERPL-MCNC: 134 MG/DL (ref ?–130)
OSMOLALITY SERPL CALC.SUM OF ELEC: 289 MOSM/KG (ref 275–295)
PLATELET # BLD AUTO: 245 10(3)UL (ref 150–450)
POTASSIUM SERPL-SCNC: 4.1 MMOL/L (ref 3.5–5.1)
PROT SERPL-MCNC: 6.8 G/DL (ref 6.4–8.2)
PSA SERPL-MCNC: 2.78 NG/ML (ref ?–4)
RBC # BLD AUTO: 4.56 X10(6)UL
SODIUM SERPL-SCNC: 140 MMOL/L (ref 136–145)
TIBC SERPL-MCNC: 294 UG/DL (ref 240–450)
TRANSFERRIN SERPL-MCNC: 197 MG/DL (ref 200–360)
TRIGL SERPL-MCNC: 100 MG/DL (ref 30–149)
TSI SER-ACNC: 0.88 MIU/ML (ref 0.36–3.74)
VLDLC SERPL CALC-MCNC: 17 MG/DL (ref 0–30)
WBC # BLD AUTO: 5 X10(3) UL (ref 4–11)

## 2024-04-04 PROCEDURE — 82728 ASSAY OF FERRITIN: CPT

## 2024-04-04 PROCEDURE — 83540 ASSAY OF IRON: CPT

## 2024-04-04 PROCEDURE — 84443 ASSAY THYROID STIM HORMONE: CPT

## 2024-04-04 PROCEDURE — 36415 COLL VENOUS BLD VENIPUNCTURE: CPT

## 2024-04-04 PROCEDURE — 84153 ASSAY OF PSA TOTAL: CPT

## 2024-04-04 PROCEDURE — 85025 COMPLETE CBC W/AUTO DIFF WBC: CPT

## 2024-04-04 PROCEDURE — 80061 LIPID PANEL: CPT

## 2024-04-04 PROCEDURE — 83550 IRON BINDING TEST: CPT

## 2024-04-04 PROCEDURE — 80053 COMPREHEN METABOLIC PANEL: CPT

## 2024-05-08 ENCOUNTER — TELEPHONE (OUTPATIENT)
Dept: INTERNAL MEDICINE CLINIC | Facility: CLINIC | Age: 69
End: 2024-05-08

## 2024-05-08 NOTE — TELEPHONE ENCOUNTER
Patient Review of Clinical Information    Problems   The patient or proxy has not reviewed this information.     Medications   The patient or proxy has not reviewed this information, and there are updates pending:   Requested Medication Removals Start Date Reported By  Comments   levothyroxine 100 MCG Tabs 12/13/2023 Shola Hernandez duplicate     Allergies   The patient or proxy has not reviewed this information.

## 2024-05-09 ENCOUNTER — OFFICE VISIT (OUTPATIENT)
Dept: INTERNAL MEDICINE CLINIC | Facility: CLINIC | Age: 69
End: 2024-05-09
Payer: MEDICARE

## 2024-05-09 VITALS
HEIGHT: 70 IN | OXYGEN SATURATION: 99 % | RESPIRATION RATE: 18 BRPM | SYSTOLIC BLOOD PRESSURE: 124 MMHG | BODY MASS INDEX: 26.2 KG/M2 | WEIGHT: 183 LBS | DIASTOLIC BLOOD PRESSURE: 72 MMHG | HEART RATE: 76 BPM | TEMPERATURE: 98 F

## 2024-05-09 DIAGNOSIS — Z92.3 HISTORY OF HEAD AND NECK RADIATION: ICD-10-CM

## 2024-05-09 DIAGNOSIS — R13.10 DYSPHAGIA, UNSPECIFIED TYPE: ICD-10-CM

## 2024-05-09 DIAGNOSIS — Z00.00 ENCOUNTER FOR ANNUAL HEALTH EXAMINATION: Primary | ICD-10-CM

## 2024-05-09 DIAGNOSIS — E03.9 HYPOTHYROIDISM, UNSPECIFIED TYPE: ICD-10-CM

## 2024-05-09 DIAGNOSIS — Z85.810 HISTORY OF TONGUE CANCER: ICD-10-CM

## 2024-05-09 DIAGNOSIS — E78.5 DYSLIPIDEMIA: ICD-10-CM

## 2024-05-09 PROCEDURE — G0438 PPPS, INITIAL VISIT: HCPCS | Performed by: FAMILY MEDICINE

## 2024-05-09 PROCEDURE — 99214 OFFICE O/P EST MOD 30 MIN: CPT | Performed by: FAMILY MEDICINE

## 2024-05-09 NOTE — PATIENT INSTRUCTIONS
Shola Hernandez's SCREENING SCHEDULE   Tests on this list are recommended by your physician but may not be covered, or covered at this frequency, by your insurer.   Please check with your insurance carrier before scheduling to verify coverage.   PREVENTATIVE SERVICES FREQUENCY &  COVERAGE DETAILS LAST COMPLETION DATE   Diabetes Screening    Fasting Blood Sugar / Glucose    One screening every 12 months if never tested or if previously tested but not diagnosed with pre-diabetes   One screening every 6 months if diagnosed with pre-diabetes Lab Results   Component Value Date    GLU 90 04/04/2024        Cardiovascular Disease Screening    Lipid Panel  Cholesterol  Lipoprotein (HDL)  Triglycerides Covered every 5 years for all Medicare beneficiaries without apparent signs or symptoms of cardiovascular disease Lab Results   Component Value Date    CHOLEST 196 04/04/2024    HDL 62 (H) 04/04/2024     (H) 04/04/2024    TRIG 100 04/04/2024         Electrocardiogram (EKG)   Covered if needed at Welcome to Medicare, and non-screening if indicated for medical reasons 05/08/2023      Ultrasound Screening for Abdominal Aortic Aneurysm (AAA) Covered once in a lifetime for one of the following risk factors   • Men who are 65-75 years old and have ever smoked   • Anyone with a family history -     Colorectal Cancer Screening  Covered for ages 50-85; only need ONE of the following:    Colonoscopy   Covered every 10 years    Covered every 2 years if patient is at high risk or previous colonoscopy was abnormal 07/29/2022    Health Maintenance   Topic Date Due   • Colorectal Cancer Screening  07/29/2027       Flexible Sigmoidoscopy   Covered every 4 years -    Fecal Occult Blood Test Covered annually -   Prostate Cancer Screening    Prostate-Specific Antigen (PSA) Annually Lab Results   Component Value Date    PSA 2.78 04/04/2024     Health Maintenance   Topic Date Due   • PSA  04/04/2025      Immunizations    Influenza Covered  once per flu season  Please get every year 12/07/2023  No recommendations at this time    Pneumococcal Each vaccine ([id number] & [id number]) covered once after 65 Prevnar 13: -    [id number]: -     No recommendations at this time    Hepatitis B One screening covered for patients with certain risk factors   -  No recommendations at this time    Tetanus Toxoid Not covered by Medicare Part B unless medically necessary (cut with metal); may be covered with your pharmacy prescription benefits -    Tetanus, Diptheria and Pertusis TD and TDaP Not covered by Medicare Part B -  No recommendations at this time    Zoster Not covered by Medicare Part B; may be covered with your pharmacy  prescription benefits -  No recommendations at this time

## 2024-05-09 NOTE — PROGRESS NOTES
Subjective:   Shola Hernandez is a 68 year old male who presents for a Medicare Initial Annual Wellness visit (Once after 12 month Medicare anniversary)  and scheduled follow up of multiple significant but stable problems. Overall doing well. Has been consistent with his current treatment and using his levothyroxine regularly. He has been having more dysphagia with foods recently. Has follow-up with his ENT tomorrow.     History/Other:   Fall Risk Assessment:   He has been screened for Falls and is low risk.      Cognitive Assessment:   He had a completely normal cognitive assessment - see flowsheet entries     Functional Ability/Status:   Shola Hernandez has some abnormal functions as listed below:  He has difficulties Affording Meds based on screening of functional status.       Depression Screening (PHQ-2/PHQ-9): PHQ-2 SCORE: 0  , done 5/8/2024        <5 minutes spent screening and counseling for depression    Advanced Directives:   He does NOT have a Living Will. [Do you have a living will?: No]  He does NOT have a Power of  for Health Care. [Do you have a healthcare power of ?: No]  Not discussed      Patient Active Problem List   Diagnosis    History of gout    ED (erectile dysfunction)    Elevated PSA    History of cervical discectomy    History of tongue cancer    Dyslipidemia    History of adenomatous polyp of colon    History of fusion of cervical spine    Hypothyroidism    History of head and neck radiation     Allergies:  He is allergic to other.    Current Medications:  Outpatient Medications Marked as Taking for the 5/9/24 encounter (Office Visit) with Mitch Hunter MD   Medication Sig    levothyroxine 75 MCG Oral Tab Take 1 tablet (75 mcg total) by mouth before breakfast.    acetaminophen 500 MG Oral Tab Take 1 tablet (500 mg total) by mouth.    sennosides 8.8 MG/5ML Oral Syrup Take 5 mL (8.8 mg total) by mouth daily as needed.    ibuprofen (MOTRIN) 200 MG Oral Tab Take 3 tablets  (600 mg total) by mouth as needed for Pain.       Medical History:  He  has a past medical history of Arthritis, Back pain, Constipation, Herniated disc, Neck pain, Problems with swallowing (2021), and Weight loss (2021).  Surgical History:  He  has a past surgical history that includes tonsillectomy; other (as teen); diagnostic anoscopy (); addl neck spine fusion; colonoscopy (2007); and spine surgery procedure unlisted.   Family History:  His family history includes CAD in his brother and mother; Cancer in his brother and father; Heart Disorder in his father; Other in his father.  Social History:  He  reports that he quit smoking about 41 years ago. His smoking use included cigars. He started smoking about 41 years ago. He has never used smokeless tobacco. He reports current alcohol use of about 6.0 standard drinks of alcohol per week. He reports that he does not use drugs.    Tobacco:  He smoked tobacco in the past but quit greater than 12 months ago.  Social History     Tobacco Use   Smoking Status Former    Current packs/day: 0.00    Types: Cigars, Cigarettes    Start date: 1983    Quit date: 1983    Years since quittin.3   Smokeless Tobacco Never   Tobacco Comments    occasional, once a month: cigar          CAGE Alcohol Screen:   CAGE screening score of 0 on 2024, showing low risk of alcohol abuse.      Patient Care Team:  Mitch Hunter MD as PCP - General (Family Medicine)  Ronan Morales MD as Consulting Physician (NEUROSURGERY)  Slime Carlson PA-C (Physician Assistant)  Lynn Lepe APRN (Internal Medicine)    Review of Systems     Negative except as in HPI    Objective:   Physical Exam    /72   Pulse 76   Temp 97.6 °F (36.4 °C)   Resp 18   Ht 5' 10\" (1.778 m)   Wt 183 lb (83 kg)   SpO2 99%   BMI 26.26 kg/m²  Estimated body mass index is 26.26 kg/m² as calculated from the following:    Height as of this encounter: 5' 10\" (1.778 m).    Weight as of this  encounter: 183 lb (83 kg).    GEN: No distress  HEENT: AMBER, ears and throat clear  CV: RRR, no mumur  CHEST: CTAB  ABD: soft, NT    Medicare Hearing Assessment:   Hearing Screening    Time taken: 5/9/2024  8:36 AM  Screening Method: Finger Rub         Visual Acuity:   Right Eye Visual Acuity: Corrected Right Eye Chart Acuity: 20/30   Left Eye Visual Acuity: Corrected Left Eye Chart Acuity: 20/30   Both Eyes Visual Acuity: Corrected Both Eyes Chart Acuity: 20/25   Able To Tolerate Visual Acuity: Yes        Assessment & Plan:   Shola Hernandez is a 68 year old male who presents for a Medicare Assessment.     Encounter for annual health examination    Hypothyroidism, unspecified type  TSH at goal on current treatment. Will repeat TFT's in 6 months.   - TSH W Reflex To Free T4 [E]; Future    History of tongue cancer  History of head and neck radiation  Has follow-up with his ENT tomorrow.     Dysphagia, unspecified type  Recommend evaluation with GI for possible esophageal stricture related to his radiation thearpy  - Gastro Referral - In Network    Dyslipidemia  Lipid panel overall stable.     The patient indicates understanding of these issues and agrees to the plan.  Reinforced healthy diet, lifestyle, and exercise.      Return in 6 months (on 11/9/2024).     Mitch Hunter MD, 5/9/2024     Supplementary Documentation:   General Health:  In the past six months, have you lost more than 10 pounds without trying?: 2 - No  Has your appetite been poor?: No  Type of Diet: Balanced  How does the patient maintain a good energy level?: Appropriate Exercise  How would you describe your daily physical activity?: Light  How would you describe your current health state?: Fair  How do you maintain positive mental well-being?: Visiting Friends  On a scale of 0 to 10, with 0 being no pain and 10 being severe pain, what is your pain level?: 3 - (Mild)  In the past six months, have you experienced urine leakage?: 0-No  At any time  do you feel concerned for the safety/well-being of yourself and/or your children, in your home or elsewhere?: No  Have you had any immunizations at another office such as Influenza, Hepatitis B, Tetanus, or Pneumococcal?: No          Shola Hernandez's SCREENING SCHEDULE   Tests on this list are recommended by your physician but may not be covered, or covered at this frequency, by your insurer.   Please check with your insurance carrier before scheduling to verify coverage.   PREVENTATIVE SERVICES FREQUENCY &  COVERAGE DETAILS LAST COMPLETION DATE   Diabetes Screening    Fasting Blood Sugar / Glucose    One screening every 12 months if never tested or if previously tested but not diagnosed with pre-diabetes   One screening every 6 months if diagnosed with pre-diabetes Lab Results   Component Value Date    GLU 90 04/04/2024        Cardiovascular Disease Screening    Lipid Panel  Cholesterol  Lipoprotein (HDL)  Triglycerides Covered every 5 years for all Medicare beneficiaries without apparent signs or symptoms of cardiovascular disease Lab Results   Component Value Date    CHOLEST 196 04/04/2024    HDL 62 (H) 04/04/2024     (H) 04/04/2024    TRIG 100 04/04/2024         Electrocardiogram (EKG)   Covered if needed at Welcome to Medicare, and non-screening if indicated for medical reasons 05/08/2023      Ultrasound Screening for Abdominal Aortic Aneurysm (AAA) Covered once in a lifetime for one of the following risk factors    Men who are 65-75 years old and have ever smoked    Anyone with a family history -     Colorectal Cancer Screening  Covered for ages 50-85; only need ONE of the following:    Colonoscopy   Covered every 10 years    Covered every 2 years if patient is at high risk or previous colonoscopy was abnormal 07/29/2022    Health Maintenance   Topic Date Due    Colorectal Cancer Screening  07/29/2027       Flexible Sigmoidoscopy   Covered every 4 years -    Fecal Occult Blood Test Covered annually -    Prostate Cancer Screening    Prostate-Specific Antigen (PSA) Annually Lab Results   Component Value Date    PSA 2.78 04/04/2024     Health Maintenance   Topic Date Due    PSA  04/04/2025      Immunizations    Influenza Covered once per flu season  Please get every year 12/07/2023  No recommendations at this time    Pneumococcal Each vaccine (Okbnblq90 & [id number]) covered once after 65 Prevnar 13: -    [id number]: -     No recommendations at this time    Hepatitis B One screening covered for patients with certain risk factors   -  No recommendations at this time    Tetanus Toxoid Not covered by Medicare Part B unless medically necessary (cut with metal); may be covered with your pharmacy prescription benefits -    Tetanus, Diptheria and Pertusis TD and TDaP Not covered by Medicare Part B -  No recommendations at this time    Zoster Not covered by Medicare Part B; may be covered with your pharmacy  prescription benefits -  No recommendations at this time

## 2024-07-23 DIAGNOSIS — E03.9 HYPOTHYROIDISM, UNSPECIFIED TYPE: ICD-10-CM

## 2024-07-27 RX ORDER — LEVOTHYROXINE SODIUM 75 UG/1
75 TABLET ORAL
Qty: 90 TABLET | Refills: 1 | Status: SHIPPED | OUTPATIENT
Start: 2024-07-27

## 2024-07-27 NOTE — TELEPHONE ENCOUNTER
Refill Passed per Protocol.     Requested Prescriptions   Pending Prescriptions Disp Refills    LEVOTHYROXINE 75 MCG Oral Tab [Pharmacy Med Name: LEVOTHYROXINE 0.075MG (75MCG) TABS] 90 tablet 0     Sig: TAKE 1 TABLET(75 MCG) BY MOUTH BEFORE BREAKFAST       Thyroid Medication Protocol Passed - 7/23/2024  6:03 PM        Passed - TSH in past 12 months        Passed - Last TSH value is normal     Lab Results   Component Value Date    TSH 0.879 04/04/2024    TSHT4 0.05 (L) 02/08/2024                 Passed - In person appointment or virtual visit in the past 12 mos or appointment in next 3 mos     Recent Outpatient Visits              2 months ago Encounter for annual health examination    [address]    Office Visit    8 months ago Abnormal thyroid blood test    [address]    Telemedicine    9 months ago Contact dermatitis due to plant    [facility], [address] [place] [address] Julianne Bansal PA-C    Office Visit    1 year ago Encounter for annual health examination    [address]    Office Visit    2 years ago Chronic rhinitis    [facility]    [facility] Visit          Future Appointments         Provider Department Appt Notes    In 3 months Mitch Hunter MD [address] 6 mo follow up                          Future Appointments         Provider Department Appt Notes    In 3 months Mitch Hunter MD [address] 6 mo follow up          Recent Outpatient Visits              2 months ago Encounter for annual health examination    [address] Mitch Paul MD    Office Visit    8 months ago Abnormal thyroid  blood test    [facility], [address] [place] [address], Mitch Paul MD    Telemedicine    9 months ago Contact dermatitis due to plant    [facility], [facility], [address], Julianne Chris PA-C    Office Visit    1 year ago Encounter for annual health examination    [facility], [address] [place] [address], Mitch Paul MD    Office Visit    2 years ago Chronic rhinitis    [facility], [address] [facility] [address], Tristen Chow MD    Office Visit

## 2024-11-01 ENCOUNTER — LABORATORY ENCOUNTER (OUTPATIENT)
Dept: LAB | Age: 69
End: 2024-11-01
Attending: FAMILY MEDICINE
Payer: MEDICARE

## 2024-11-01 DIAGNOSIS — E03.9 HYPOTHYROIDISM, UNSPECIFIED TYPE: ICD-10-CM

## 2024-11-01 LAB — TSI SER-ACNC: 1.42 UIU/ML (ref 0.55–4.78)

## 2024-11-01 PROCEDURE — 36415 COLL VENOUS BLD VENIPUNCTURE: CPT

## 2024-11-01 PROCEDURE — 84443 ASSAY THYROID STIM HORMONE: CPT

## 2024-11-07 ENCOUNTER — OFFICE VISIT (OUTPATIENT)
Dept: INTERNAL MEDICINE CLINIC | Facility: CLINIC | Age: 69
End: 2024-11-07
Payer: MEDICARE

## 2024-11-07 ENCOUNTER — HOSPITAL ENCOUNTER (OUTPATIENT)
Dept: GENERAL RADIOLOGY | Age: 69
Discharge: HOME | End: 2024-11-07
Attending: FAMILY MEDICINE
Payer: MEDICARE

## 2024-11-07 VITALS
DIASTOLIC BLOOD PRESSURE: 64 MMHG | OXYGEN SATURATION: 97 % | SYSTOLIC BLOOD PRESSURE: 130 MMHG | WEIGHT: 188 LBS | HEART RATE: 73 BPM | BODY MASS INDEX: 27 KG/M2 | TEMPERATURE: 97 F

## 2024-11-07 DIAGNOSIS — E03.9 HYPOTHYROIDISM, UNSPECIFIED TYPE: Primary | ICD-10-CM

## 2024-11-07 DIAGNOSIS — M47.816 LUMBAR SPONDYLOSIS: ICD-10-CM

## 2024-11-07 DIAGNOSIS — S16.1XXS CERVICAL MYOFASCIAL STRAIN, SEQUELA: ICD-10-CM

## 2024-11-07 DIAGNOSIS — Z98.1 HISTORY OF FUSION OF CERVICAL SPINE: ICD-10-CM

## 2024-11-07 PROCEDURE — 72110 X-RAY EXAM L-2 SPINE 4/>VWS: CPT | Performed by: FAMILY MEDICINE

## 2024-11-07 PROCEDURE — 99214 OFFICE O/P EST MOD 30 MIN: CPT | Performed by: FAMILY MEDICINE

## 2024-11-07 RX ORDER — LEVOTHYROXINE SODIUM 75 UG/1
75 TABLET ORAL
Qty: 90 TABLET | Refills: 3 | Status: SHIPPED | OUTPATIENT
Start: 2024-11-07

## 2024-11-07 NOTE — PROGRESS NOTES
Shola Hernandez  6/12/1955    Chief Complaint   Patient presents with    Follow - Up     6 month f/u + lab results   Cramping of L side of throat feels like a knot   Ability to walk very limited due to lower back pain       HPI:   Shola Hernandez is a 69 year old male who presents for follow-up. Has bene having left sided cervical neck pain that feels like cramping in addition to worsening low back pain with radicular symptoms with walking. He had ENT follow-up after our last visit with laryngoscopy, RICHARD.     Current Outpatient Medications   Medication Sig Dispense Refill    levothyroxine 75 MCG Oral Tab Take 1 tablet (75 mcg total) by mouth before breakfast. 90 tablet 1    acetaminophen 500 MG Oral Tab Take 1 tablet (500 mg total) by mouth.      ibuprofen (MOTRIN) 200 MG Oral Tab Take 3 tablets (600 mg total) by mouth as needed for Pain.      sennosides 8.8 MG/5ML Oral Syrup Take 5 mL (8.8 mg total) by mouth daily as needed. (Patient not taking: Reported on 11/7/2024)        Allergies[1]   Past Medical History:    Arthritis    ongoing    Back pain    ongoing    Constipation    Occasional    Herniated disc    Neck pain    Problems with swallowing    Cancer surgery    Weight loss    Cancer surgery      Patient Active Problem List   Diagnosis    History of gout    ED (erectile dysfunction)    Elevated PSA    History of cervical discectomy    History of tongue cancer    Dyslipidemia    History of adenomatous polyp of colon    History of fusion of cervical spine    Hypothyroidism    History of head and neck radiation      Past Surgical History:   Procedure Laterality Date    Addl neck spine fusion      Colonoscopy  6/20/2007    [facility]-Repeat in 5 yrs- Dr Eliot Muir    Diagnostic anoscopy  2008    colonoscopy    Other  as teen    removal of growth left leg/benign    Spine surgery procedure unlisted      L5-L7 fused    Tonsillectomy        Family History   Problem Relation Age of Onset    Cancer Father          chronic lymphocytic leukemia    Other (Other[other]) Father     Heart Disorder Father     Other (CAD[other]) Mother     Cancer Brother         testicular    Other (CAD[other]) Brother       Social History     Socioeconomic History    Marital status:    Tobacco Use    Smoking status: Former     Current packs/day: 0.00     Types: Cigars, Cigarettes     Start date: 1983     Quit date: 1983     Years since quittin.8    Smokeless tobacco: Never    Tobacco comments:     occasional, once a month: cigar   Vaping Use    Vaping status: Never Used   Substance and Sexual Activity    Alcohol use: Yes     Alcohol/week: 6.0 standard drinks of alcohol     Types: 6 Shots of liquor per week    Drug use: No    Sexual activity: Yes   Other Topics Concern    Caffeine Concern No    Exercise No     Comment: walking and hiking         REVIEW OF SYSTEMS:   GENERAL: feels well otherwise, no recent illness.     EXAM:   /64 (BP Location: Right arm, Patient Position: Sitting, Cuff Size: large)   Pulse 73   Temp 97 °F (36.1 °C) (Temporal)   Wt 188 lb (85.3 kg)   SpO2 97%   BMI 26.98 kg/m²   GENERAL: Well developed, well nourished,in no apparent distress  SKIN: No rash  MSK: anterior cervical myofascial tenderness through the left SCM. No lumbar midline TTP or neural tension on exam  LUNGS: clear to auscultation  CARDIO: RRR without murmur      ASSESSMENT AND PLAN:   Shola Hernandez is a 69 year old male who presents for follow-up    Hypothyroidism, unspecified type  TSH at goal on current dose. Will repeat TSH prior to CPE in May.   - levothyroxine 75 MCG Oral Tab; Take 1 tablet (75 mcg total) by mouth before breakfast.  Dispense: 90 tablet; Refill: 3    History of fusion of cervical spine  Cervical myofascial strain, sequela  Symptoms are myofacial in nature. Will begin PT  - OP REFERRAL TO [facility]    Lumbar spondylosis  Will evaluate with XR today and begin dedicated PT. If symptoms not  improving, recommend evaluation with Dr. Barragan.   - OP REFERRAL TO [facility]  - Physiatry Referral - In Network  - XR LUMBAR SPINE (MIN 4 VIEWS) (CPT=72110); Future      All questions were answered and the patient agrees with the plan.     Thank you,  Mitch Hunter MD         [1]   Allergies  Allergen Reactions    Other HIVES and ITCHING     Watery eyes

## 2024-12-03 ENCOUNTER — OFFICE VISIT (OUTPATIENT)
Dept: PHYSICAL THERAPY | Facility: HOSPITAL | Age: 69
End: 2024-12-03
Attending: FAMILY MEDICINE
Payer: MEDICARE

## 2024-12-03 DIAGNOSIS — S16.1XXS CERVICAL MYOFASCIAL STRAIN, SEQUELA: ICD-10-CM

## 2024-12-03 DIAGNOSIS — M47.816 LUMBAR SPONDYLOSIS: ICD-10-CM

## 2024-12-03 DIAGNOSIS — Z98.1 HISTORY OF FUSION OF CERVICAL SPINE: Primary | ICD-10-CM

## 2024-12-03 PROCEDURE — 97161 PT EVAL LOW COMPLEX 20 MIN: CPT

## 2024-12-03 PROCEDURE — 97110 THERAPEUTIC EXERCISES: CPT

## 2024-12-03 NOTE — PROGRESS NOTES
SPINE EVALUATION:     Diagnosis:   History of fusion of cervical spine (Z98.1)  Cervical myofascial strain, sequela (S16.1XXS)  Lumbar spondylosis (M47.816)      Referring Provider: Mitch Hunter  Date of Evaluation:    12/3/2024    Precautions:  Cancer, hx of spinal fusion Next MD visit:   none scheduled  Date of Surgery: n/a     PATIENT SUMMARY   Shola Hernandez is a 69 year old male who presents to therapy today with complaints of cramping on bilateral anterior cervical spine (L>R). Progressively gotten worse in the last few months. Reports history of radiation directed at the cervical spine. History of cervical spondylosis. History of fusion at C5-C7  Biggest complaint is the lumbar pain and pain into the right leg. Reports N/T going all the way down into the foot. Location down the back of the thigh and calf. Pt reports that if he does a squat and comes up, that it helps get rid of the pain.   Pt reports that he is able to sleep through the night without pain.  Pt describes pain level current 1/10, at best 0/10, at worst 6/10.   Pain location: bilateral lumbar pain, right posterior/medial leg  Pain description: N/T in the right leg, stabbing pain in the lumbar pain  Current functional limitations include can only walk about a 1/3 mile, lifting heavy overhead.   Irritability: moderate    24 hour pattern: very sore first thing in the morning  Aggravating factors: walking  Relieving factors: warm shower, squatting        Shola describes prior level of function fairly active with walking, riding bike. Pt goals include learn exercises, improve lower back mobility, determine what is causing the neck cramping.  Past medical history was reviewed with Shola. Significant findings include   Past Medical History:    Arthritis    ongoing    Back pain    ongoing    Constipation    Occasional    Herniated disc    Neck pain    Problems with swallowing    Cancer surgery    Weight loss    Cancer surgery       Pt denies  diplopia, dysarthria, dysphasia, dizziness, drop attacks, bowel/bladder changes, saddle anesthesia, and CYDNEY LE N/T.    ASSESSMENT  Shola presents to physical therapy evaluation with primary c/o bilateral lumbar pain and pain radiating down the right buttock and posterior leg. The results of the objective tests and measures show decreased lumbar range of motion, positive neurodynamic test, tightness of hip flexors and hamstrings.  Functional deficits include but are not limited to walking desired distance, lifting overhead.  Signs and symptoms are consistent with diagnosis of lumbar spondylosis. Pt and PT discussed evaluation findings, pathology, POC and HEP.  Pt voiced understanding and performs HEP correctly without reported pain. Skilled Physical Therapy is medically necessary to address the above impairments and reach functional goals.     OBJECTIVE:   Observation/Posture: increased kyphosis. Pt has difficulty obtaining upright position with standing. Lateral shift to the left with completion of sit to stand.  Neuro Screen: unremarkable. 2+ bilateral patellar and Achilles reflexes     Lumbar AROM: (* denotes performed with pain)  Flexion: 45* in hamstrings  Extension: 5 deg from neutral*  Sidebending: R min impairment; L min impairment  Rotation: R mod impairment; L mod impairment    Accessory motion: hypomobility throughout lumbar and thoracic spine. Pain with CPA at L2-3  Palpation: tenderness to palpation of right piriformis, IT band    Strength: (* denotes performed with pain)  LE   Hip flexion (L2): R 4/5; L 4/5  Hip abduction: R 4/5; L 4/5  Hip Extension: R 4/5; L 4/5   Hip ER: R 4/5; L 4/5  Hip IR: R 4/5; L 4/5  Knee Flexion: R 4/5; L 4/5   Knee extension (L3): R 4/5; L 4/5   DF (L4): R 4/5; L 4/5  Great Toe Ext (L5): R 4/5, L 4/5  PF (S1): R 4/5; L 4/5     Flexibility:   LE   Hip Flexor: R min impairment, L min impairment  Hamstrings: R mod impairment; L mod impairment  Piriformis: R mod impairment; L mod  impairment         Gait: pt ambulates on level ground with normal mechanics.    Today’s Treatment and Response:   Pt education was provided on exam findings, treatment diagnosis, treatment plan, expectations, and prognosis. Pt was also provided recommendations for activity modifications, possible soreness after evaluation, modalities as needed [ice/heat], postural corrections, importance of remaining active, and shoe wear  Patient was instructed in and issued a HEP for: Access Code: [email or web address]  URL: [email or web address]  Date: 12/03/2024  Prepared by: Gaston Baldwin    Exercises  - Supine Piriformis Stretch with Foot on Ground  - 2-3 x daily - 7 x weekly - 1 sets - 5 reps - 30 hold  - Supine Figure 4 Piriformis Stretch  - 2-3 x daily - 7 x weekly - 1 sets - 5 reps - 30 hold  - Supine Double Knee to Chest Modified  - 2-3 x daily - 7 x weekly - 1 sets - 5 reps - 20 hold  - Plank with Thoracic Rotation on Counter  - 2-3 x daily - 7 x weekly - 1 sets - 10 reps    Charges: PT Eval Low Complexity, 1 TherEx      Total Timed Treatment: 10 min     Total Treatment Time: 45 min     Based on clinical rationale and outcome measures, this evaluation involved Low Complexity decision making due to 1-2 personal factors/comorbidities, 3 body structures involved/activity limitations, and evolving symptoms including changing pain levels.  PLAN OF CARE:    Goals: (to be met in 5 visits)   Pt will improve transversus abdominis recruitment to perform proper isometric contraction without requiring verbal or tactile cuing to promote advancement of therex   Pt will demonstrate good understanding of proper posture and body mechanics to decrease pain and improve spinal safety   Pt will improve lumbar spine AROM flexion to >50 deg to allow increase ease with bending forward to don shoes   Pt will report improved symptom centralization and absence of radicular symptoms for 3 consecutive days to improve function with ADL   Pt  will have decreased paraspinal mm tension to tolerate standing >20 minutes for work and home activities   Pt will demonstrate improved core strength to be able to perform overhead lifting with <2/10 pain   Pt will be independent and compliant with comprehensive HEP to maintain progress achieved in PT       Frequency / Duration: Patient will be seen for 2 x/week or a total of 5 visits over a 90 day period. Treatment will include: Manual Therapy, Neuromuscular Re-education, Therapeutic Activities, Therapeutic Exercise, and Home Exercise Program instruction    Education or treatment limitation: None  Rehab Potential:good    Oswestry Disability Index Score  Score: (Patient-Rptd) 22 % (12/3/2024  7:37 AM)      Patient/Family/Caregiver was advised of these findings, precautions, and treatment options and has agreed to actively participate in planning and for this course of care.    Thank you for your referral. Please co-sign or sign and return this letter via fax as soon as possible to [phone]. If you have any questions, please contact me at Dept: [phone]    Sincerely,  Electronically signed by therapist: Gaston Baldwin, PT    Physician's certification required: Yes  I certify the need for these services furnished under this plan of treatment and while under my care.    X___________________________________________________ Date____________________    Certification From: 12/3/2024  To:3/3/2025

## 2024-12-06 ENCOUNTER — OFFICE VISIT (OUTPATIENT)
Dept: PHYSICAL THERAPY | Facility: HOSPITAL | Age: 69
End: 2024-12-06
Attending: FAMILY MEDICINE
Payer: MEDICARE

## 2024-12-06 PROCEDURE — 97140 MANUAL THERAPY 1/> REGIONS: CPT

## 2024-12-06 PROCEDURE — 97110 THERAPEUTIC EXERCISES: CPT

## 2024-12-06 NOTE — PROGRESS NOTES
Diagnosis:   History of fusion of cervical spine (Z98.1)  Cervical myofascial strain, sequela (S16.1XXS)  Lumbar spondylosis (M47.816)        Referring Provider: Mitch Hunter  Date of Evaluation:    12/3/2024    Precautions:   Cancer, hx of spinal fusion Next MD visit:   none scheduled  Date of Surgery: n/a   Insurance Primary/Secondary: MEDICARE / [employer] [place] INDEMNITY     # Auth Visits: POC 5 visits            Subjective: the thoracic rotation exercise caused some pain into the lumbar spine, so I haven't been able to get that one done. It seems like the other exercises are going pretty well.     Pain: 2/10 thoracic region       Objective:   Lumbar AROM: (* denotes performed with pain)  Flexion: 50* in hamstrings  Extension: 5 deg from neutral, unable to move more  Sidebending: R min impairment; L min impairment  Rotation: R mod impairment; L mod impairment      Assessment: pt tolerated session well. Pt continues to demonstrate lack of thoracic and lumbar extension. Reported that rotational exercises caused increased pain, therefore discontinued. Focused on stretching of lumbar/thoracic spine and improving mobility. Pt's HEP updated      Goals: (to be met in 5 visits)   Pt will improve transversus abdominis recruitment to perform proper isometric contraction without requiring verbal or tactile cuing to promote advancement of therex   Pt will demonstrate good understanding of proper posture and body mechanics to decrease pain and improve spinal safety   Pt will improve lumbar spine AROM flexion to >50 deg to allow increase ease with bending forward to don shoes   Pt will report improved symptom centralization and absence of radicular symptoms for 3 consecutive days to improve function with ADL   Pt will have decreased paraspinal mm tension to tolerate standing >20 minutes for work and home activities   Pt will demonstrate improved core strength to be able to perform overhead lifting with <2/10 pain   Pt will be  independent and compliant with comprehensive HEP to maintain progress achieved in PT    Plan: continue with stretching. Assess symptoms following discontinuing rotation exercises  Date: 12/6/2024  TX#: 2/5 Date:                 TX#: 3/ Date:                 TX#: 4/ Date:                 TX#: 5/ Date:   Tx#: 6/   Manual Therapy  CPA at T1-T8 grade III-IV  UPA R/L at T1-T8 grade III-IV  Transverse mobs grade III-IV R/L       TherEx  Cat/Cow 2 x 10  Child's pose 4 x 30 sec holds  Seated ball roll outs 10 x 10 sec holds  Doorway pec stretch 4 x 15 sec holds   Thoracic extension over chair 10 x 10 sec holds                     HEP: Access Code: [email or web address]  URL: [email or web address]  Date: 12/06/2024  Prepared by: Gaston Baldwin    Exercises  - Supine Piriformis Stretch with Foot on Ground  - 2-3 x daily - 7 x weekly - 1 sets - 5 reps - 30 hold  - Supine Figure 4 Piriformis Stretch  - 2-3 x daily - 7 x weekly - 1 sets - 5 reps - 30 hold  - Supine Double Knee to Chest Modified  - 2-3 x daily - 7 x weekly - 1 sets - 5 reps - 20 hold  - Child's Pose Stretch  - 2-3 x daily - 7 x weekly - 1 sets - 4 reps - 30 hold  - Cat Cow  - 2-3 x daily - 7 x weekly - 3 sets - 2 reps - 10 hold  - Doorway Pec Stretch at 60 Elevation  - 2-3 x daily - 7 x weekly - 1 sets - 4 reps - 20 hold  - Seated Thoracic Lumbar Extension  - 2-3 x daily - 7 x weekly - 1 sets - 10 reps - 5 hold  - Seated Lumbar Flexion Stretch  - 2-3 x daily - 7 x weekly - 1 sets - 10 reps - 10 hold    Charges: 1 MT 2 therex       Total Timed Treatment: 45 min  Total Treatment Time: 45 min

## 2024-12-10 ENCOUNTER — OFFICE VISIT (OUTPATIENT)
Dept: PHYSICAL THERAPY | Facility: HOSPITAL | Age: 69
End: 2024-12-10
Attending: FAMILY MEDICINE
Payer: MEDICARE

## 2024-12-10 PROCEDURE — 97140 MANUAL THERAPY 1/> REGIONS: CPT

## 2024-12-10 PROCEDURE — 97110 THERAPEUTIC EXERCISES: CPT

## 2024-12-10 NOTE — PROGRESS NOTES
Diagnosis:   History of fusion of cervical spine (Z98.1)  Cervical myofascial strain, sequela (S16.1XXS)  Lumbar spondylosis (M47.816)        Referring Provider: Mitch Hunter  Date of Evaluation:    12/3/2024    Precautions:   Cancer, hx of spinal fusion Next MD visit:   none scheduled  Date of Surgery: n/a   Insurance Primary/Secondary: MEDICARE / [employer] INDEMNITY     # Auth Visits: POC 5 visits            Subjective: the back seems to be getting better. Yesterday I did get the shooting pain down the right leg, turning into some numbness. That pain will stay until I sit down   There seems to be some muscular pain along the right hip on the lateral side.    The new stretches for the pecs seemed to aggravate the sternum region.   Pain: 2/10 thoracic region       Objective:   Lumbar AROM: (* denotes performed with pain)  Flexion: 50* in hamstrings  Extension: 5 deg (arms crossed chest), 5 deg at end of session  Sidebending: R min impairment; L min impairment  Rotation: R mod impairment; L mod impairment      Assessment: pt tolerated session well. Pt demonstrated improved of lumbar extension. Pt reported increased right hip pain that travels into the glute. Very tender to palpation of piriformis. Improvement of symptoms following STM and stretches. Increased mobility with UPA and CPA at the upper thoracic spine. Next session will be last treatment as pt is re-locating to [place] at end of week.    Goals: (to be met in 5 visits)   Pt will improve transversus abdominis recruitment to perform proper isometric contraction without requiring verbal or tactile cuing to promote advancement of therex   Pt will demonstrate good understanding of proper posture and body mechanics to decrease pain and improve spinal safety   Pt will improve lumbar spine AROM flexion to >50 deg to allow increase ease with bending forward to don shoes   Pt will report improved symptom centralization and absence of radicular symptoms for 3  consecutive days to improve function with ADL   Pt will have decreased paraspinal mm tension to tolerate standing >20 minutes for work and home activities   Pt will demonstrate improved core strength to be able to perform overhead lifting with <2/10 pain   Pt will be independent and compliant with comprehensive HEP to maintain progress achieved in PT    Plan: provide further mobility exercises. Discharge as pt is re-locating to [place] at end of week  Date: 12/6/2024  TX#: 2/5 Date:12/10/2024                 TX#: 3/5 Date:                 TX#: 4/ Date:                 TX#: 5/ Date:   Tx#: 6/   Manual Therapy  CPA at T1-T8 grade III-IV  UPA R/L at T1-T8 grade III-IV  Transverse mobs grade III-IV R/L Manual Therapy  STM and trigger points to right piriformis   CPA at T1-T5 grade III-IV  UPA R/L at T1-T5 grade III-IV  Transverse mobs grade III-IV R/L      TherEx  Cat/Cow 2 x 10  Child's pose 4 x 30 sec holds  Seated ball roll outs 10 x 10 sec holds  Doorway pec stretch 4 x 15 sec holds   Thoracic extension over chair 10 x 10 sec holds TherEx  Supine figure four stretch 3 x 30 sec holds ea leg  Supine figure four stretch 3 x 30 sec holds ea leg   Cat/Cow 2 x 10  Child's pose 4 x 30 sec holds  Thoracic extension over chair 10 x 10 sec holds                    HEP: Access Code: [email or web address]  URL: [email or web address]  Date: 12/06/2024  Prepared by: Gaston Baldwin    Exercises  - Supine Piriformis Stretch with Foot on Ground  - 2-3 x daily - 7 x weekly - 1 sets - 5 reps - 30 hold  - Supine Figure 4 Piriformis Stretch  - 2-3 x daily - 7 x weekly - 1 sets - 5 reps - 30 hold  - Supine Double Knee to Chest Modified  - 2-3 x daily - 7 x weekly - 1 sets - 5 reps - 20 hold  - Child's Pose Stretch  - 2-3 x daily - 7 x weekly - 1 sets - 4 reps - 30 hold  - Cat Cow  - 2-3 x daily - 7 x weekly - 3 sets - 2 reps - 10 hold  - Doorway Pec Stretch at 60 Elevation  - 2-3 x daily - 7 x weekly - 1 sets - 4 reps - 20  hold  - Seated Thoracic Lumbar Extension  - 2-3 x daily - 7 x weekly - 1 sets - 10 reps - 5 hold  - Seated Lumbar Flexion Stretch  - 2-3 x daily - 7 x weekly - 1 sets - 10 reps - 10 hold    Charges: 1 MT 2 therex       Total Timed Treatment: 45 min  Total Treatment Time: 45 min

## 2024-12-12 ENCOUNTER — OFFICE VISIT (OUTPATIENT)
Dept: PHYSICAL THERAPY | Facility: HOSPITAL | Age: 69
End: 2024-12-12
Attending: FAMILY MEDICINE
Payer: MEDICARE

## 2024-12-12 PROCEDURE — 97110 THERAPEUTIC EXERCISES: CPT

## 2024-12-12 PROCEDURE — 97140 MANUAL THERAPY 1/> REGIONS: CPT

## 2024-12-12 NOTE — PROGRESS NOTES
Diagnosis:   History of fusion of cervical spine (Z98.1)  Cervical myofascial strain, sequela (S16.1XXS)  Lumbar spondylosis (M47.816)        Referring Provider: Mitch Hunter  Date of Evaluation:    12/3/2024    Precautions:   Cancer, hx of spinal fusion Next MD visit:   none scheduled  Date of Surgery: n/a   Insurance Primary/Secondary: MEDICARE / [employer]BS [place] INDEMNITY     # Auth Visits: POC 5 visits           Discharge Summary  Pt has attended 4 visits in Physical Therapy.     Subjective: the day after I saw you last, I was feeling pretty good. Then there was some tightness/soreness in the chest and between the shoulder blades  there has been a little bit of left hip pain this last two days.   The new stretches for the pecs seemed to aggravate the sternum region.   Pain: 2/10 thoracic region       Objective:   Lumbar AROM: (* denotes performed with pain)  Flexion: 50* in hamstrings  Extension: 5 deg (arms crossed chest), 8 deg at end of session  Sidebending: R min impairment; L min impairment  Rotation: R mod impairment; L mod impairment    Strength: (* denotes performed with pain)  LE   Hip flexion (L2): R 4/5; L 4/5  Hip abduction: R 4/5; L 4/5  Hip Extension: R 4/5; L 4/5            Hip ER: R 4/5; L 4/5  Hip IR: R 4/5; L 4/5  Knee Flexion: R 4/5; L 4/5            Knee extension (L3): R 4/5; L 4/5            DF (L4): R 4/5; L 4/5  Great Toe Ext (L5): R 4/5, L 4/5  PF (S1): R 4/5; L 4/5      Flexibility:   LE   Hip Flexor: R min impairment, L min impairment  Hamstrings: R mod impairment; L mod impairment  Piriformis: R mod impairment; L mod impairment       Assessment: pt tolerated session well. Pt demonstrated improved of lumbar extension. Continues to demonstrate tenderness to palpation of bilateral piriformis. Pt continues to demonstrate mild improvements of lumbar extension. Pt has subjectively reported that symptoms in the low back are doing better. Pt understands the importance of continuing with HEP upon  discharge. Discussion on reaching out to referring provider for paper referral if symptoms increase while staying in [place] the next couple of months. Pt has demonstrated improvement on STONEY outcome measure since evaluation    Goals: (to be met in 5 visits)   Pt will improve transversus abdominis recruitment to perform proper isometric contraction without requiring verbal or tactile cuing to promote advancement of therex   Pt will demonstrate good understanding of proper posture and body mechanics to decrease pain and improve spinal safety   Pt will improve lumbar spine AROM flexion to >50 deg to allow increase ease with bending forward to don shoes   Pt will report improved symptom centralization and absence of radicular symptoms for 3 consecutive days to improve function with ADL   Pt will have decreased paraspinal mm tension to tolerate standing >20 minutes for work and home activities   Pt will demonstrate improved core strength to be able to perform overhead lifting with <2/10 pain   Pt will be independent and compliant with comprehensive HEP to maintain progress achieved in PT  Oswestry Disability Index Score  Score: (Patient-Rptd) 22 % (12/3/2024  7:37 AM)    Post Oswestry Disability Index Score  Post Score: (Patient-Rptd) 14 % (12/12/2024  1:34 PM)    8 % improvement    Plan: discharge from skilled therapy with instruction to continue with HEP      Patient/Family/Caregiver was advised of these findings, precautions, and treatment options and has agreed to actively participate in planning and for this course of care.    Thank you for your referral. If you have any questions, please contact me at Dept: [phone].    Sincerely,  Electronically signed by therapist: Gaston Baldwin, PT     Physician's certification required:  Yes  Please co-sign or sign and return this letter via fax as soon as possible to [phone].   I certify the need for these services furnished under this plan of treatment and while  under my care.    X___________________________________________________ Date____________________    Certification From: 12/12/2024  To:3/12/2025     Plan: Discharge from skilled therapy with instruction to continue with HEP   Date: 12/6/2024  TX#: 2/5 Date:12/10/2024                 TX#: 3/5 Date:12/12/2024                 TX#: 4/5 Date:                 TX#: 5/ Date:   Tx#: 6/   Manual Therapy  CPA at T1-T8 grade III-IV  UPA R/L at T1-T8 grade III-IV  Transverse mobs grade III-IV R/L Manual Therapy  STM and trigger points to right piriformis   CPA at T1-T5 grade III-IV  UPA R/L at T1-T5 grade III-IV  Transverse mobs grade III-IV R/L Manual Therapy  STM and trigger points to right piriformis   CPA at T1-T5 grade III-IV  Transverse mobs grade III-IV R/L     TherEx  Cat/Cow 2 x 10  Child's pose 4 x 30 sec holds  Seated ball roll outs 10 x 10 sec holds  Doorway pec stretch 4 x 15 sec holds   Thoracic extension over chair 10 x 10 sec holds TherEx  Supine figure four stretch 3 x 30 sec holds ea leg  Supine figure four stretch 3 x 30 sec holds ea leg   Cat/Cow 2 x 10  Child's pose 4 x 30 sec holds  Thoracic extension over chair 10 x 10 sec holds TherEx  Cat/Cow 2 x 10  Child's pose 4 x 30 sec holds  Thoracic extension over chair 10 x 10 sec holds  Supine figure four stretch 3 x 30 sec holds ea leg  Supine figure four stretch 3 x 30 sec holds ea leg   Education on importance of continuing with HEP, using heat to help with tightness                     HEP: Access Code: [email or web address]  URL: [email or web address]  Date: 12/06/2024  Prepared by: Gaston Baldwin    Exercises  - Supine Piriformis Stretch with Foot on Ground  - 2-3 x daily - 7 x weekly - 1 sets - 5 reps - 30 hold  - Supine Figure 4 Piriformis Stretch  - 2-3 x daily - 7 x weekly - 1 sets - 5 reps - 30 hold  - Supine Double Knee to Chest Modified  - 2-3 x daily - 7 x weekly - 1 sets - 5 reps - 20 hold  - Child's Pose Stretch  - 2-3 x daily - 7 x weekly  - 1 sets - 4 reps - 30 hold  - Cat Cow  - 2-3 x daily - 7 x weekly - 3 sets - 2 reps - 10 hold  - Doorway Pec Stretch at 60 Elevation  - 2-3 x daily - 7 x weekly - 1 sets - 4 reps - 20 hold  - Seated Thoracic Lumbar Extension  - 2-3 x daily - 7 x weekly - 1 sets - 10 reps - 5 hold  - Seated Lumbar Flexion Stretch  - 2-3 x daily - 7 x weekly - 1 sets - 10 reps - 10 hold    Charges: 1 MT 2 therex       Total Timed Treatment: 45 min  Total Treatment Time: 45 min

## 2025-04-28 ENCOUNTER — TELEPHONE (OUTPATIENT)
Age: 70
End: 2025-04-28

## 2025-04-28 DIAGNOSIS — R97.20 ELEVATED PSA: ICD-10-CM

## 2025-04-28 DIAGNOSIS — Z13.220 SCREENING FOR LIPID DISORDERS: Primary | ICD-10-CM

## 2025-04-28 DIAGNOSIS — Z00.00 ROUTINE GENERAL MEDICAL EXAMINATION AT A HEALTH CARE FACILITY: ICD-10-CM

## 2025-04-28 DIAGNOSIS — Z13.1 SCREENING FOR DIABETES MELLITUS: ICD-10-CM

## 2025-04-28 DIAGNOSIS — E78.5 DYSLIPIDEMIA: ICD-10-CM

## 2025-04-28 DIAGNOSIS — Z12.5 SCREENING FOR PROSTATE CANCER: ICD-10-CM

## 2025-04-28 DIAGNOSIS — Z13.0 SCREENING FOR DEFICIENCY ANEMIA: ICD-10-CM

## 2025-04-28 DIAGNOSIS — E03.9 HYPOTHYROIDISM, UNSPECIFIED TYPE: ICD-10-CM

## 2025-04-28 NOTE — TELEPHONE ENCOUNTER
Future Appointments   Date Time Provider Department Center   6/10/2025 11:30 AM Mitch Hunter MD [facility]     Orders to edward     Pt informed that labs need to be completed no sooner than 2 weeks prior to the appt. Pt aware to fast-no call back required

## 2025-04-28 NOTE — TELEPHONE ENCOUNTER
Labs enter and pending for approved   Active lab orders placed per [facility] protocol    Last annual physical; 5/9/2024  Last labs: 4/14/2024    Next annual physical: 6/10/2025 at 11:30am

## 2025-06-02 ENCOUNTER — LAB ENCOUNTER (OUTPATIENT)
Dept: LAB | Age: 70
End: 2025-06-02
Attending: FAMILY MEDICINE
Payer: MEDICARE

## 2025-06-02 DIAGNOSIS — E78.5 DYSLIPIDEMIA: ICD-10-CM

## 2025-06-02 DIAGNOSIS — Z13.1 SCREENING FOR DIABETES MELLITUS: ICD-10-CM

## 2025-06-02 DIAGNOSIS — Z13.220 SCREENING FOR LIPID DISORDERS: ICD-10-CM

## 2025-06-02 DIAGNOSIS — E03.9 HYPOTHYROIDISM, UNSPECIFIED TYPE: ICD-10-CM

## 2025-06-02 DIAGNOSIS — Z00.00 ROUTINE GENERAL MEDICAL EXAMINATION AT A HEALTH CARE FACILITY: ICD-10-CM

## 2025-06-02 DIAGNOSIS — Z13.0 SCREENING FOR DEFICIENCY ANEMIA: ICD-10-CM

## 2025-06-02 DIAGNOSIS — Z12.5 SCREENING FOR PROSTATE CANCER: ICD-10-CM

## 2025-06-02 LAB
ALBUMIN SERPL-MCNC: 4.3 G/DL (ref 3.2–4.8)
ALBUMIN/GLOB SERPL: 1.6 {RATIO} (ref 1–2)
ALP LIVER SERPL-CCNC: 67 U/L (ref 45–117)
ALT SERPL-CCNC: 28 U/L (ref 10–49)
ANION GAP SERPL CALC-SCNC: 6 MMOL/L (ref 0–18)
AST SERPL-CCNC: 26 U/L (ref ?–34)
BASOPHILS # BLD AUTO: 0.05 X10(3) UL (ref 0–0.2)
BASOPHILS NFR BLD AUTO: 1 %
BILIRUB SERPL-MCNC: 0.5 MG/DL (ref 0.2–1.1)
BUN BLD-MCNC: 14 MG/DL (ref 9–23)
CALCIUM BLD-MCNC: 9.2 MG/DL (ref 8.7–10.6)
CHLORIDE SERPL-SCNC: 104 MMOL/L (ref 98–112)
CHOLEST SERPL-MCNC: 211 MG/DL (ref ?–200)
CO2 SERPL-SCNC: 29 MMOL/L (ref 21–32)
COMPLEXED PSA SERPL-MCNC: 3.68 NG/ML (ref ?–4)
CREAT BLD-MCNC: 1.1 MG/DL (ref 0.7–1.3)
EGFRCR SERPLBLD CKD-EPI 2021: 74 ML/MIN/1.73M2 (ref 60–?)
EOSINOPHIL # BLD AUTO: 0.17 X10(3) UL (ref 0–0.7)
EOSINOPHIL NFR BLD AUTO: 3.4 %
ERYTHROCYTE [DISTWIDTH] IN BLOOD BY AUTOMATED COUNT: 14.5 %
FASTING PATIENT LIPID ANSWER: YES
FASTING STATUS PATIENT QL REPORTED: YES
GLOBULIN PLAS-MCNC: 2.7 G/DL (ref 2–3.5)
GLUCOSE BLD-MCNC: 89 MG/DL (ref 70–99)
HCT VFR BLD AUTO: 43.1 % (ref 39–53)
HDLC SERPL-MCNC: 78 MG/DL (ref 40–59)
HGB BLD-MCNC: 13.7 G/DL (ref 13–17.5)
IMM GRANULOCYTES # BLD AUTO: 0.07 X10(3) UL (ref 0–1)
IMM GRANULOCYTES NFR BLD: 1.4 %
LDLC SERPL CALC-MCNC: 122 MG/DL (ref ?–100)
LYMPHOCYTES # BLD AUTO: 0.79 X10(3) UL (ref 1–4)
LYMPHOCYTES NFR BLD AUTO: 15.9 %
MCH RBC QN AUTO: 30.1 PG (ref 26–34)
MCHC RBC AUTO-ENTMCNC: 31.8 G/DL (ref 31–37)
MCV RBC AUTO: 94.7 FL (ref 80–100)
MONOCYTES # BLD AUTO: 0.61 X10(3) UL (ref 0.1–1)
MONOCYTES NFR BLD AUTO: 12.2 %
NEUTROPHILS # BLD AUTO: 3.29 X10 (3) UL (ref 1.5–7.7)
NEUTROPHILS # BLD AUTO: 3.29 X10(3) UL (ref 1.5–7.7)
NEUTROPHILS NFR BLD AUTO: 66.1 %
NONHDLC SERPL-MCNC: 133 MG/DL (ref ?–130)
OSMOLALITY SERPL CALC.SUM OF ELEC: 288 MOSM/KG (ref 275–295)
PLATELET # BLD AUTO: 282 10(3)UL (ref 150–450)
POTASSIUM SERPL-SCNC: 4.2 MMOL/L (ref 3.5–5.1)
PROT SERPL-MCNC: 7 G/DL (ref 5.7–8.2)
RBC # BLD AUTO: 4.55 X10(6)UL (ref 3.8–5.8)
SODIUM SERPL-SCNC: 139 MMOL/L (ref 136–145)
TRIGL SERPL-MCNC: 61 MG/DL (ref 30–149)
TSI SER-ACNC: 1.43 UIU/ML (ref 0.55–4.78)
VLDLC SERPL CALC-MCNC: 11 MG/DL (ref 0–30)
WBC # BLD AUTO: 5 X10(3) UL (ref 4–11)

## 2025-06-02 PROCEDURE — 80053 COMPREHEN METABOLIC PANEL: CPT

## 2025-06-02 PROCEDURE — 85025 COMPLETE CBC W/AUTO DIFF WBC: CPT

## 2025-06-02 PROCEDURE — 36415 COLL VENOUS BLD VENIPUNCTURE: CPT

## 2025-06-02 PROCEDURE — 84443 ASSAY THYROID STIM HORMONE: CPT

## 2025-06-02 PROCEDURE — 80061 LIPID PANEL: CPT

## 2025-06-05 ENCOUNTER — OFFICE VISIT (OUTPATIENT)
Age: 70
End: 2025-06-05
Payer: MEDICARE

## 2025-06-05 VITALS
WEIGHT: 194 LBS | BODY MASS INDEX: 27.77 KG/M2 | RESPIRATION RATE: 16 BRPM | HEIGHT: 70 IN | SYSTOLIC BLOOD PRESSURE: 138 MMHG | OXYGEN SATURATION: 98 % | DIASTOLIC BLOOD PRESSURE: 78 MMHG | HEART RATE: 72 BPM | TEMPERATURE: 99 F

## 2025-06-05 DIAGNOSIS — G89.29 CHRONIC NECK PAIN: ICD-10-CM

## 2025-06-05 DIAGNOSIS — E03.9 HYPOTHYROIDISM, UNSPECIFIED TYPE: ICD-10-CM

## 2025-06-05 DIAGNOSIS — Z98.890 HISTORY OF CERVICAL DISCECTOMY: ICD-10-CM

## 2025-06-05 DIAGNOSIS — Z00.00 ENCOUNTER FOR ANNUAL HEALTH EXAMINATION: Primary | ICD-10-CM

## 2025-06-05 DIAGNOSIS — Z98.1 HISTORY OF FUSION OF CERVICAL SPINE: ICD-10-CM

## 2025-06-05 DIAGNOSIS — Z85.810 HISTORY OF TONGUE CANCER: ICD-10-CM

## 2025-06-05 DIAGNOSIS — M47.816 LUMBAR SPONDYLOSIS: ICD-10-CM

## 2025-06-05 DIAGNOSIS — E78.5 DYSLIPIDEMIA: ICD-10-CM

## 2025-06-05 DIAGNOSIS — R03.0 ELEVATED BLOOD PRESSURE READING: ICD-10-CM

## 2025-06-05 DIAGNOSIS — Z92.3 HISTORY OF HEAD AND NECK RADIATION: ICD-10-CM

## 2025-06-05 DIAGNOSIS — Z86.0101 HISTORY OF ADENOMATOUS POLYP OF COLON: ICD-10-CM

## 2025-06-05 DIAGNOSIS — M54.2 CHRONIC NECK PAIN: ICD-10-CM

## 2025-06-05 NOTE — PROGRESS NOTES
Subjective:   Shola Hernandez is a 69 year old male who presents for a Medicare Wellness Visit charge within the last 11 months and Patient may not meet criteria for AWV: Please evaluate for correct coding and scheduled follow up of multiple significant but stable problems.           Overall he has been well but has continued to have intermittent low back pain. He has been in PT for myofacial neck pain. Mai noted his BP being elevated at various doctor appts as well. Not currently monitoring at home.     History/Other:   Fall Risk Assessment:   He has been screened for Falls and is low risk.      Cognitive Assessment:   He had a completely normal cognitive assessment - see flowsheet entries     Functional Ability/Status:   Shola Hernandez has a completely normal functional assessment. See flowsheet for details.      Depression Screening (PHQ):  PHQ-9 TOTAL SCORE: 5  , done 6/5/2025   Trouble falling or staying asleep, or sleeping too much: 3     Feeling tired or having little energy: 2    If you checked off any problems, how difficult have these problems made it for you to do your work, take care of things at home, or get along with other people?: Not difficult at all    Last [place] Suicide Screening on 6/5/2025 was No Risk.     <5 minutes spent screening and counseling for depression    Advanced Directives:   He does have a Living Will but we do NOT have it on file in Epic.    He does have a POA but we do NOT have it on file in Epic.    Not discussed      Patient Active Problem List   Diagnosis    History of gout    ED (erectile dysfunction)    Elevated PSA    History of cervical discectomy    History of tongue cancer    Dyslipidemia    History of adenomatous polyp of colon    History of fusion of cervical spine    Hypothyroidism    History of head and neck radiation     Allergies:  He is allergic to other.    Current Medications:  Outpatient Medications Marked as Taking for the 6/5/25 encounter (Office Visit)  with Mitch Hunter MD   Medication Sig    levothyroxine 75 MCG Oral Tab Take 1 tablet (75 mcg total) by mouth before breakfast.    acetaminophen 500 MG Oral Tab Take 1 tablet (500 mg total) by mouth.    sennosides 8.8 MG/5ML Oral Syrup Take 5 mL (8.8 mg total) by mouth daily as needed.    ibuprofen (MOTRIN) 200 MG Oral Tab Take 3 tablets (600 mg total) by mouth as needed for Pain.       Medical History:  He  has a past medical history of Arthritis, Back pain, Constipation, Herniated disc, Neck pain, Problems with swallowing (4/2021), and Weight loss (4/2021).  Surgical History:  He  has a past surgical history that includes tonsillectomy; other (as teen); diagnostic anoscopy (2008); addl neck spine fusion; colonoscopy (6/20/2007); and spine surgery procedure unlisted.   Family History:  His family history includes CAD in his brother and mother; Cancer in his brother and father; Heart Disorder in his father; Other in his father.  Social History:  He  reports that he quit smoking about 42 years ago. His smoking use included cigars and cigarettes. He started smoking about 42 years ago. He has never been exposed to tobacco smoke. He has never used smokeless tobacco. He reports current alcohol use of about 6.0 standard drinks of alcohol per week. He reports that he does not use drugs.    Tobacco:  He smoked tobacco in the past but quit greater than 12 months ago.  Tobacco Use[1]     CAGE Alcohol Screen:   CAGE screening score of 0 on 5/29/2025, showing low risk of alcohol abuse.      Patient Care Team:  Mitch Hunter MD as PCP - General (Family Medicine)  Ronan Morales MD as Consulting Physician (NEUROSURGERY)  Slime Carlson PA-C (Physician Assistant)  Lynn Lepe APRN (Internal Medicine)  Gaston Baldwin PT as Physical Therapist (Physical Therapy)    Review of Systems     Negative except as in HPI    Objective:   Physical Exam  /78 (BP Location: Right arm, Patient Position: Sitting, Cuff Size: large)    Pulse 72   Temp 98.9 °F (37.2 °C) (Temporal)   Resp 16   Ht 5' 10\" (1.778 m)   Wt 194 lb (88 kg)   SpO2 98%   BMI 27.84 kg/m²  Estimated body mass index is 27.84 kg/m² as calculated from the following:    Height as of this encounter: 5' 10\" (1.778 m).    Weight as of this encounter: 194 lb (88 kg).  GEN: well appearing, no distress  HEENT: AMBER, ears clear  CV: RRR, no murmur  CHEST: CTAB  ADB: soft, NT  EXT: no swelling    Medicare Hearing Assessment:   Hearing Screening    Screening Method: Finger Rub  Finger Rub Result: Pass         Assessment & Plan:   Shola Hernandez is a 69 year old male who presents for a Medicare Assessment.     Encounter for annual health examination    Hypothyroidism, unspecified type  TSH at goal on current levothyroxine dose.      Dyslipidemia  Lipid panel stable. Continue emphasis on diet and exercise optimization    History of tongue cancer  History of head and neck radiation  Following regularly with ENT. Clinically [facility]    History of fusion of cervical spine  History of cervical discectomy  Chronic neck pain  Lumbar spondylosis  Has started PT for cervical myofacial pain but has continued to have intermittent low back pain and neck pain. Recommend evaluation with Dr. Barragan.   - Physiatry Referral - In Network    History of adenomatous polyp of colon  Colonoscopy UTD    Elevated blood pressure reading  Will begin home monitoring. Follow-up in 6 weeks with log and cuff.       The patient indicates understanding of these issues and agrees to the plan.  Reinforced healthy diet, lifestyle, and exercise.      Return in 6 months (on 12/5/2025).     Mitch Hunter MD, 6/5/2025     Supplementary Documentation:   General Health:  In the past six months, have you lost more than 10 pounds without trying?: (Patient-Rptd) 2 - No  Has your appetite been poor?: (Patient-Rptd) No  Type of Diet: (Patient-Rptd) Balanced  How does the patient maintain a good energy level?: (Patient-Rptd)  Appropriate Exercise, Stretching  How would you describe your daily physical activity?: (Patient-Rptd) Moderate  How would you describe your current health state?: (Patient-Rptd) Good  How do you maintain positive mental well-being?: (Patient-Rptd) Social Interaction, Visiting Family  On a scale of 0 to 10, with 0 being no pain and 10 being severe pain, what is your pain level?: (Patient-Rptd) 5 - (Moderate)  In the past six months, have you experienced urine leakage?: (Patient-Rptd) 0-No  At any time do you feel concerned for the safety/well-being of yourself and/or your children, in your home or elsewhere?: No  Have you had any immunizations at another office such as Influenza, Hepatitis B, Tetanus, or Pneumococcal?: (Patient-Rptd) No    Health Maintenance   Topic Date Due    COVID-19 Vaccine ( season) 2025    Annual Physical  2025    Influenza Vaccine (Season Ended) 10/01/2025    PSA  2026    Colorectal Cancer Screening  2027    Annual Depression Screening  Completed    Fall Risk Screening (Annual)  Completed    Pneumococcal Vaccine: 50+ Years  Completed    Zoster Vaccines  Completed    Meningococcal B Vaccine  Aged Out          [1]   Social History  Tobacco Use   Smoking Status Former    Current packs/day: 0.00    Types: Cigars, Cigarettes    Start date: 1983    Quit date: 1983    Years since quittin.4    Passive exposure: Never   Smokeless Tobacco Never   Tobacco Comments    occasional, once a month: cigar

## 2025-07-24 ENCOUNTER — OFFICE VISIT (OUTPATIENT)
Dept: PHYSICAL MEDICINE AND REHAB | Facility: CLINIC | Age: 70
End: 2025-07-24
Payer: MEDICARE

## 2025-07-24 VITALS — BODY MASS INDEX: 27.77 KG/M2 | HEART RATE: 78 BPM | OXYGEN SATURATION: 97 % | HEIGHT: 70 IN | WEIGHT: 194 LBS

## 2025-07-24 DIAGNOSIS — M54.16 LUMBAR RADICULOPATHY: ICD-10-CM

## 2025-07-24 DIAGNOSIS — M54.50 CHRONIC BILATERAL LOW BACK PAIN WITHOUT SCIATICA: ICD-10-CM

## 2025-07-24 DIAGNOSIS — G89.29 CHRONIC BILATERAL LOW BACK PAIN WITHOUT SCIATICA: ICD-10-CM

## 2025-07-24 DIAGNOSIS — Z98.1 S/P CERVICAL SPINAL FUSION: Primary | ICD-10-CM

## 2025-07-24 PROCEDURE — 99204 OFFICE O/P NEW MOD 45 MIN: CPT | Performed by: PHYSICAL MEDICINE & REHABILITATION

## 2025-07-24 NOTE — PROGRESS NOTES
The following individual(s) verbally consented to be recorded using ambient AI listening technology and understand that they can each withdraw their consent to this listening technology at any point by asking the clinician to turn off or pause the recording:    Patient name: Shola Hernandez  Additional names:

## 2025-07-24 NOTE — PROGRESS NOTES
NEW PATIENT VISIT    CHIEF COMPLAINT  Low back, mid back and neck Pain      HISTORY OF PRESENTING ILLNESS  Shola Hernandez is a 70 year old male who presents for evaluation of low back mid back and neck pain.  Patient states that he has been dealing with whole spine pain since around 30 years ago.  No clear inciting or injury.  Describes a sharp and dull pain in the low back with radiation to the right hip and buttock.  Pain is rated 4/10.  He also has numbness and tingling in the feet and the fingers.  Endorses weakness of the legs.  Currently using ibuprofen as needed.    Did participate in physical therapy as well as a home exercise program.  Had neck fusion 5 years ago.  Underwent steroid injections 15 years ago.  X-ray of the lumbar spine dated 11/7/2024.    History of Present Illness  Shola Hernandez is a 70 year old male with a history of lower back pain and sacroiliac pain who presents with persistent back pain. He was referred by Dr. Tyson for evaluation of persistent back pain.    He has experienced severe back pain since his 30s, possibly related to gout, which is alleviated by high doses of NSAIDs. He has taken oral steroids in the past with good effect. Over the past decade, he has noticed a significant decline in his ability to engage in physical activities due to leg weakness and muscular pain after prolonged activity. Walking has become difficult, with pain and numbness developing after a quarter mile, necessitating frequent stretching.    Upper back pain began after starting levothyroxine following neck radiation. The pain is constant, stiff, and located from the rib cage upwards, particularly on the right side, radiating from the spine. He experiences difficulty standing up straight and has pain in the thoracic spine, more pronounced in the morning, affecting his sleep.    PAST MEDICAL HISTORY  Past Medical History[1]    PAST SURGICAL HISTORY  Past Surgical  History[2]    MEDICATIONS  Medications Ordered Prior to Encounter[3]    ALLERGIES  Allergies[4]    SOCIAL HISTORY   reports that he quit smoking about 42 years ago. His smoking use included cigars and cigarettes. He started smoking about 42 years ago. He has never been exposed to tobacco smoke. He has never used smokeless tobacco. He reports current alcohol use of about 6.0 standard drinks of alcohol per week. He reports that he does not use drugs.    FAMILY HISTORY  Family History[5]    REVIEW OF SYSTEMS  Complete review of systems was performed and was negative except for those items stated in the History of Presenting Illness and Past Medical/Surgical History.    PHYSICAL EXAMINATION  GENERAL:  In no acute distress. Well-developed and well nourished.   SKIN: No rashes or open wounds involving the upper extremities and neck.  NEUROLOGIC:   Strength: 5/5 throughout bilateral upper and lower extremities in all major muscle groups.   Sensation: intact light touch sensation throughout bilateral upper and lower extremities.   Reflexes: intact and symmetric in bilateral upper and lower extremities. Thomas’s negative. Babinski downgoing bilaterally. No clonus.   Gait: able to heel walk, toe walk, and perform tandem gait.   MUSCULOSKELETAL:  Physical Exam  Patient has restricted range of motion lumbar spine as well as the thoracic spine.  Forward flexion exacerbates thoracic pain with some radicular features over on the right primarily at the level of T8.  Demonstrates positive facet loading bilaterally.  Negative neural tension testing in the lower extremities.  REVIEW OF PRIOR X-RAYS/STUDIES  Updated x-ray of the lumbar spine dated 11/7/2024 reveals dextroscoliosis with multilevel degenerative changes.  Anterolisthesis of L5 on S1 with left-sided spondylolysis.  Disc space narrowing at L5-S1.    IMPRESSION/DIAGNOSIS  Encounter Diagnoses   Name Primary?    S/P cervical spinal fusion Yes    Lumbar radiculopathy      Chronic bilateral low back pain without sciatica        TREATMENT/PLAN  Assessment & Plan  Chronic low back pain with radiculopathy  Chronic low back pain with radiculopathy, worsening over the past decade, with symptoms of pain, numbness, and tingling in the legs, exacerbated by walking and relieved by stretching. Possible claudication symptoms. Previous imaging indicated grade one spondylolisthesis, spondylolysis, and disc bulging.  - Order MRI of the lumbar spine to assess discs and nerves.  - Encourage continuation of lower back stretching and leg exercises.  - Coordinate with physical therapist to address low back issues.    Thoracic radiculopathy  Thoracic radiculopathy with pain radiating horizontally across the thoracic spine, more pronounced on the right side, with stiffness and difficulty standing upright. Previous imaging showed changes in the thoracic spine.  - Order MRI of the thoracic spine to evaluate radicular pain.  - Instruct physical therapist to include thoracic spine in therapy regimen.    Cervical radiculopathy  Cervical radiculopathy with muscular tightness and sharp pain radiating to the ear. Currently undergoing physical therapy for radiation-induced muscular cramping in the neck.  - Order MRI of the cervical spine to assess current status post-radiation.  - Continue physical therapy for cervical spine.    Spondylolisthesis  Grade one spondylolisthesis noted in previous imaging, contributing to chronic low back pain and radiculopathy.  - Include in MRI assessment of lumbar spine.  - Address in physical therapy regimen.    Spondylolysis  Spondylolysis noted in previous imaging, contributing to chronic low back pain and radiculopathy.  - Include in MRI assessment of lumbar spine.  - Address in physical therapy regimen.    Degenerative disc disease  Degenerative disc disease with disc bulging noted in previous imaging, contributing to chronic low back pain and radiculopathy.  - Include in MRI  assessment of lumbar spine.  - Address in physical therapy regimen.      Education was provided regarding the above impression/diagnosis and treatment options/plan were discussed.  All questions were answered during today's visit.  Patient will contact clinic if any other questions or concerns.            Perez Barragan DO  Interventional Spine and Sports Medicine Specialist   Physical Medicine and Rehabilitation  [facility]  [address] [place] [id number]    [facility]  [address] [place] [address]                 [1]   Past Medical History:   Arthritis    ongoing    Back pain    ongoing    Cancer (HCC)    HPV Toung & Neck    Constipation    Occasional    Herniated disc    Hypothyroidism    HPV related radiation    Neck pain    Problems with swallowing    Cancer surgery    Weight loss    Cancer surgery   [2]   Past Surgical History:  Procedure Laterality Date    Addl neck spine fusion      Colonoscopy  06/20/2007    [facility]-Repeat in 5 yrs- Dr Eliot Muir    Colonoscopy      Diagnostic anoscopy  2008    colonoscopy    Other  as teen    removal of growth left leg/benign    Other surgical history  4/21-7/21    HPV Head/Neck & Radiation    Spine surgery procedure unlisted      L5-L7 fused    Tonsillectomy     [3]   Current Outpatient Medications on File Prior to Visit   Medication Sig Dispense Refill    levothyroxine 75 MCG Oral Tab Take 1 tablet (75 mcg total) by mouth before breakfast. 90 tablet 3    acetaminophen 500 MG Oral Tab Take 1 tablet (500 mg total) by mouth.      sennosides 8.8 MG/5ML Oral Syrup Take 5 mL (8.8 mg total) by mouth daily as needed.      ibuprofen (MOTRIN) 200 MG Oral Tab Take 3 tablets (600 mg total) by mouth as needed for Pain.       No current facility-administered medications on file prior to visit.   [4]   Allergies  Allergen Reactions    Other HIVES and ITCHING     Watery eyes   [5]   Family History  Problem  Relation Age of Onset    Cancer Father         CLL, Liver    Other (Other[other]) Father     Heart Disorder Father     Other (CAD[other]) Mother     Heart Disorder Mother     Cancer Brother         testicular    Other (CAD[other]) Brother     Cancer Brother         Liverl    Cancer Brother         Testicular, Lung, Liver    Cancer Brother         Liver    Cancer Brother         Testicular, Lung, Liver    Cancer Brother         Liver

## 2025-07-31 ENCOUNTER — HOSPITAL ENCOUNTER (OUTPATIENT)
Dept: MRI IMAGING | Age: 70
Discharge: HOME | End: 2025-07-31
Attending: PHYSICAL MEDICINE & REHABILITATION

## 2025-07-31 DIAGNOSIS — Z98.1 S/P CERVICAL SPINAL FUSION: ICD-10-CM

## 2025-07-31 DIAGNOSIS — M54.16 LUMBAR RADICULOPATHY: ICD-10-CM

## 2025-07-31 PROCEDURE — 72146 MRI CHEST SPINE W/O DYE: CPT | Performed by: PHYSICAL MEDICINE & REHABILITATION

## 2025-07-31 PROCEDURE — 72148 MRI LUMBAR SPINE W/O DYE: CPT | Performed by: PHYSICAL MEDICINE & REHABILITATION

## 2025-07-31 PROCEDURE — 72141 MRI NECK SPINE W/O DYE: CPT | Performed by: PHYSICAL MEDICINE & REHABILITATION

## 2025-08-18 ENCOUNTER — OFFICE VISIT (OUTPATIENT)
Dept: PHYSICAL MEDICINE AND REHAB | Facility: CLINIC | Age: 70
End: 2025-08-18

## 2025-08-18 VITALS
OXYGEN SATURATION: 96 % | WEIGHT: 194 LBS | SYSTOLIC BLOOD PRESSURE: 130 MMHG | HEIGHT: 70 IN | DIASTOLIC BLOOD PRESSURE: 78 MMHG | BODY MASS INDEX: 27.77 KG/M2 | HEART RATE: 62 BPM

## 2025-08-18 DIAGNOSIS — M54.16 LUMBAR RADICULOPATHY: Primary | ICD-10-CM

## 2025-08-18 DIAGNOSIS — M54.50 CHRONIC BILATERAL LOW BACK PAIN WITHOUT SCIATICA: ICD-10-CM

## 2025-08-18 DIAGNOSIS — G89.29 CHRONIC BILATERAL LOW BACK PAIN WITHOUT SCIATICA: ICD-10-CM

## 2025-08-18 PROCEDURE — 99214 OFFICE O/P EST MOD 30 MIN: CPT | Performed by: PHYSICAL MEDICINE & REHABILITATION

## (undated) DIAGNOSIS — Z00.00 ROUTINE GENERAL MEDICAL EXAMINATION AT A HEALTH CARE FACILITY: Primary | ICD-10-CM

## (undated) DIAGNOSIS — Z12.5 SCREENING FOR MALIGNANT NEOPLASM OF PROSTATE: ICD-10-CM

## (undated) DIAGNOSIS — Z13.228 SCREENING FOR METABOLIC DISORDER: ICD-10-CM

## (undated) DIAGNOSIS — Z13.29 SCREENING FOR THYROID DISORDER: ICD-10-CM

## (undated) DIAGNOSIS — Z13.220 SCREENING FOR LIPID DISORDERS: ICD-10-CM

## (undated) DIAGNOSIS — Z13.0 SCREENING FOR DISORDER OF BLOOD AND BLOOD-FORMING ORGANS: ICD-10-CM

## (undated) NOTE — ED AVS SNAPSHOT
Lucijeyson Imelda   MRN: [id number]    Department:  [facility] Emergency Department   Date of Visit:  6/30/2018           Disclosure     Insurance plans vary and the physician(s) referred by the ER may not be covered by your plan.  Please contact your tell this physician (or your personal doctor if your instructions are to return to your personal doctor) about any new or lasting problems. The primary care or specialist physician will see patients referred from the [facility] Emergency Department.  Cristhian Brandon